# Patient Record
Sex: MALE | Race: ASIAN | NOT HISPANIC OR LATINO | URBAN - METROPOLITAN AREA
[De-identification: names, ages, dates, MRNs, and addresses within clinical notes are randomized per-mention and may not be internally consistent; named-entity substitution may affect disease eponyms.]

---

## 2020-01-02 ENCOUNTER — EMERGENCY (EMERGENCY)
Facility: HOSPITAL | Age: 40
LOS: 1 days | Discharge: HOME | End: 2020-01-02
Attending: EMERGENCY MEDICINE | Admitting: INTERNAL MEDICINE
Payer: SELF-PAY

## 2020-01-02 VITALS
DIASTOLIC BLOOD PRESSURE: 91 MMHG | HEART RATE: 126 BPM | SYSTOLIC BLOOD PRESSURE: 145 MMHG | OXYGEN SATURATION: 100 % | RESPIRATION RATE: 18 BRPM | TEMPERATURE: 98 F

## 2020-01-02 LAB
ALBUMIN SERPL ELPH-MCNC: 4.5 G/DL — SIGNIFICANT CHANGE UP (ref 3.3–5)
ALP SERPL-CCNC: 91 U/L — SIGNIFICANT CHANGE UP (ref 40–120)
ALT FLD-CCNC: 39 U/L — SIGNIFICANT CHANGE UP (ref 4–41)
ANION GAP SERPL CALC-SCNC: 14 MMO/L — SIGNIFICANT CHANGE UP (ref 7–14)
AST SERPL-CCNC: 34 U/L — SIGNIFICANT CHANGE UP (ref 4–40)
BASOPHILS # BLD AUTO: 0.06 K/UL — SIGNIFICANT CHANGE UP (ref 0–0.2)
BASOPHILS NFR BLD AUTO: 0.6 % — SIGNIFICANT CHANGE UP (ref 0–2)
BILIRUB SERPL-MCNC: 0.3 MG/DL — SIGNIFICANT CHANGE UP (ref 0.2–1.2)
BUN SERPL-MCNC: 10 MG/DL — SIGNIFICANT CHANGE UP (ref 7–23)
CALCIUM SERPL-MCNC: 9.8 MG/DL — SIGNIFICANT CHANGE UP (ref 8.4–10.5)
CHLORIDE SERPL-SCNC: 99 MMOL/L — SIGNIFICANT CHANGE UP (ref 98–107)
CO2 SERPL-SCNC: 23 MMOL/L — SIGNIFICANT CHANGE UP (ref 22–31)
CREAT SERPL-MCNC: 0.8 MG/DL — SIGNIFICANT CHANGE UP (ref 0.5–1.3)
EOSINOPHIL # BLD AUTO: 0.47 K/UL — SIGNIFICANT CHANGE UP (ref 0–0.5)
EOSINOPHIL NFR BLD AUTO: 4.9 % — SIGNIFICANT CHANGE UP (ref 0–6)
GLUCOSE SERPL-MCNC: 162 MG/DL — HIGH (ref 70–99)
HCT VFR BLD CALC: 48.4 % — SIGNIFICANT CHANGE UP (ref 39–50)
HGB BLD-MCNC: 16.3 G/DL — SIGNIFICANT CHANGE UP (ref 13–17)
IMM GRANULOCYTES NFR BLD AUTO: 0.6 % — SIGNIFICANT CHANGE UP (ref 0–1.5)
INR BLD: 0.97 — SIGNIFICANT CHANGE UP (ref 0.88–1.17)
LYMPHOCYTES # BLD AUTO: 2.82 K/UL — SIGNIFICANT CHANGE UP (ref 1–3.3)
LYMPHOCYTES # BLD AUTO: 29.7 % — SIGNIFICANT CHANGE UP (ref 13–44)
MCHC RBC-ENTMCNC: 29.3 PG — SIGNIFICANT CHANGE UP (ref 27–34)
MCHC RBC-ENTMCNC: 33.7 % — SIGNIFICANT CHANGE UP (ref 32–36)
MCV RBC AUTO: 86.9 FL — SIGNIFICANT CHANGE UP (ref 80–100)
MONOCYTES # BLD AUTO: 0.75 K/UL — SIGNIFICANT CHANGE UP (ref 0–0.9)
MONOCYTES NFR BLD AUTO: 7.9 % — SIGNIFICANT CHANGE UP (ref 2–14)
NEUTROPHILS # BLD AUTO: 5.35 K/UL — SIGNIFICANT CHANGE UP (ref 1.8–7.4)
NEUTROPHILS NFR BLD AUTO: 56.3 % — SIGNIFICANT CHANGE UP (ref 43–77)
NRBC # FLD: 0 K/UL — SIGNIFICANT CHANGE UP (ref 0–0)
PLATELET # BLD AUTO: 223 K/UL — SIGNIFICANT CHANGE UP (ref 150–400)
PMV BLD: 9.7 FL — SIGNIFICANT CHANGE UP (ref 7–13)
POTASSIUM SERPL-MCNC: 3.8 MMOL/L — SIGNIFICANT CHANGE UP (ref 3.5–5.3)
POTASSIUM SERPL-SCNC: 3.8 MMOL/L — SIGNIFICANT CHANGE UP (ref 3.5–5.3)
PROT SERPL-MCNC: 7.4 G/DL — SIGNIFICANT CHANGE UP (ref 6–8.3)
PROTHROM AB SERPL-ACNC: 11.1 SEC — SIGNIFICANT CHANGE UP (ref 9.8–13.1)
RBC # BLD: 5.57 M/UL — SIGNIFICANT CHANGE UP (ref 4.2–5.8)
RBC # FLD: 12.7 % — SIGNIFICANT CHANGE UP (ref 10.3–14.5)
SODIUM SERPL-SCNC: 136 MMOL/L — SIGNIFICANT CHANGE UP (ref 135–145)
WBC # BLD: 9.51 K/UL — SIGNIFICANT CHANGE UP (ref 3.8–10.5)
WBC # FLD AUTO: 9.51 K/UL — SIGNIFICANT CHANGE UP (ref 3.8–10.5)

## 2020-01-02 PROCEDURE — 99285 EMERGENCY DEPT VISIT HI MDM: CPT

## 2020-01-02 PROCEDURE — 71046 X-RAY EXAM CHEST 2 VIEWS: CPT | Mod: 26

## 2020-01-02 NOTE — ED ADULT NURSE NOTE - OBJECTIVE STATEMENT
Received Pt in intake room 13. Pt A&OX3, ambulatory. Pt brought in from home for elevated blood pressure, states he takes his blood pressure at home, non-compliant with medication. Pt also reports palpitations and left sided non radiating chest pain beginning tonight. Pt appears stable and in no apparent distress. Respirations are equal and nonlabored, no respiratory distress noted. Sinus rhythm on monitor. Denies any other complaints at this time. Denies sob, n & v, diarrhea, fever, cough, dizziness, headache. 20 gauge IV placed in the left ac, labs sent. Pt stable, family at bedside. awaiting further plan

## 2020-01-02 NOTE — ED ADULT TRIAGE NOTE - CHIEF COMPLAINT QUOTE
Pt brought in from home for elevated blood pressure, non-compliant with medication, prescribed Amlodipine, took dose with EMS, no sob, breathing even and unlabored. Pt also has h/o anxiety, states he is feels his palpitations.

## 2020-01-03 VITALS
RESPIRATION RATE: 18 BRPM | DIASTOLIC BLOOD PRESSURE: 98 MMHG | SYSTOLIC BLOOD PRESSURE: 128 MMHG | HEART RATE: 104 BPM | OXYGEN SATURATION: 99 %

## 2020-01-03 DIAGNOSIS — R07.9 CHEST PAIN, UNSPECIFIED: ICD-10-CM

## 2020-01-03 LAB
TROPONIN T, HIGH SENSITIVITY: < 6 NG/L — SIGNIFICANT CHANGE UP (ref ?–14)
TSH SERPL-MCNC: 1.83 UIU/ML — SIGNIFICANT CHANGE UP (ref 0.27–4.2)

## 2020-01-03 RX ORDER — ASPIRIN/CALCIUM CARB/MAGNESIUM 324 MG
81 TABLET ORAL DAILY
Refills: 0 | Status: DISCONTINUED | OUTPATIENT
Start: 2020-01-03 | End: 2020-01-03

## 2020-01-03 RX ORDER — INFLUENZA VIRUS VACCINE 15; 15; 15; 15 UG/.5ML; UG/.5ML; UG/.5ML; UG/.5ML
0.5 SUSPENSION INTRAMUSCULAR ONCE
Refills: 0 | Status: DISCONTINUED | OUTPATIENT
Start: 2020-01-03 | End: 2020-01-03

## 2020-01-03 RX ORDER — ACETAMINOPHEN 500 MG
650 TABLET ORAL EVERY 6 HOURS
Refills: 0 | Status: DISCONTINUED | OUTPATIENT
Start: 2020-01-03 | End: 2020-01-03

## 2020-01-03 RX ORDER — AMLODIPINE BESYLATE 2.5 MG/1
10 TABLET ORAL DAILY
Refills: 0 | Status: DISCONTINUED | OUTPATIENT
Start: 2020-01-03 | End: 2020-01-03

## 2020-01-03 RX ADMIN — Medication 81 MILLIGRAM(S): at 11:46

## 2020-01-03 RX ADMIN — AMLODIPINE BESYLATE 10 MILLIGRAM(S): 2.5 TABLET ORAL at 11:46

## 2020-01-03 NOTE — H&P ADULT - HISTORY OF PRESENT ILLNESS
This is a 39 year old man with HTN and Tachycardia presented to [facility] ER on 1/2/2020 with left sided exertional chest pain with diaphoresis that occurred yesterday while doing work in a basement. Pt states he checked his blood pressure at home and noticed it was 180/120, prompting him to come to the ED. Pt reports palpitations.  Pt denies fever, chills, cough, shortness of breath, calf pain, leg swelling, weakness, dizziness, or any other acute complaints.

## 2020-01-03 NOTE — CONSULT NOTE ADULT - SUBJECTIVE AND OBJECTIVE BOX
Cardiovascular Disease Initial Evaluation    CHIEF COMPLAINT: Chest Pain    HISTORY OF PRESENT ILLNESS:  This is a 39 year old man with HTN and Tachycardia presented to [facility] ER on 1/2/2020 with left sided exertional chest pain with diaphoresis that occurred yesterday while doing work in a basement. Pt states he checked his blood pressure at home and noticed it was 180/120, prompting him to come to the ED. Pt reports palpitations.  Pt denies fever, chills, cough, shortness of breath, calf pain, leg swelling, weakness, dizziness, or any other acute complaints.       Allergies  No Known Allergies        MEDICATIONS:    Reviewed    influenza   Vaccine 0.5 milliLiter(s) IntraMuscular once      PAST MEDICAL & SURGICAL HISTORY:  Tachycardia  HTN (hypertension)  No significant past surgical history      FAMILY HISTORY:  HTN      SOCIAL HISTORY:    The patient is a nonsmoker       REVIEW OF SYSTEMS:  See HPI, otherwise complete 14 point review of systems negative    PHYSICAL EXAM:  T(C): 36.8 (01-03-20 @ 04:07), Max: 37 (01-03-20 @ 00:50)  HR: 75 (01-03-20 @ 04:07) (75 - 126)  BP: 134/94 (01-03-20 @ 04:07) (134/94 - 156/94)  RR: 16 (01-03-20 @ 04:07) (16 - 18)  SpO2: 98% (01-03-20 @ 04:07) (98% - 100%)  Wt(kg): --  I&O's Summary      Appearance: No Acute Distress; resting comfortably  HEENT:  Normal oral mucosa, PERRL, EOMI	  Cardiovascular: Normal S1 S2, No JVD, No murmurs/rubs/gallops  Respiratory: Normal respiratory effort; Lungs clear to auscultation bilaterally  Gastrointestinal:  Soft, Non-tender, + BS	  Skin: No rashes, No ecchymoses, No cyanosis	  Neurologic: Non-focal; no weakness  Extremities: No clubbing, cyanosis or edema  Vascular: Peripheral pulses palpable 2+ bilaterally  Psychiatry: A & O x 3, Mood & affect appropriate    Laboratory Data:	 	    CBC Full  -  ( 02 Jan 2020 22:44 )  WBC Count : 9.51 K/uL  Hemoglobin : 16.3 g/dL  Hematocrit : 48.4 %  Platelet Count - Automated : 223 K/uL  Mean Cell Volume : 86.9 fL  Mean Cell Hemoglobin : 29.3 pg  Mean Cell Hemoglobin Concentration : 33.7 %  Auto Neutrophil # : 5.35 K/uL  Auto Lymphocyte # : 2.82 K/uL  Auto Monocyte # : 0.75 K/uL  Auto Eosinophil # : 0.47 K/uL  Auto Basophil # : 0.06 K/uL  Auto Neutrophil % : 56.3 %  Auto Lymphocyte % : 29.7 %  Auto Monocyte % : 7.9 %  Auto Eosinophil % : 4.9 %  Auto Basophil % : 0.6 %    01-02    136  |  99  |  10  ----------------------------<  162<H>  3.8   |  23  |  0.80    Ca    9.8      02 Jan 2020 22:44    TPro  7.4  /  Alb  4.5  /  TBili  0.3  /  DBili  x   /  AST  34  /  ALT  39  /  AlkPhos  91  01-02      proBNP:   Lipid Profile:   HgA1c:   TSH: Thyroid Stimulating Hormone, Serum: 1.83 uIU/mL (01-02 @ 22:44)      Interpretation of Telemetry: Sinus; no ectopy	    ECG:  	Sinus tachycardia; no ischemic changes.     Assessment: 39 year old man with HTN presents with chest pain.    Plan of Care:    #Chest pain-  Patient has ruled out for ACS.  EKG is sinus tachycardia with no ischemic changes.  Will further risk stratify with exercise treadmill stress testing.       72 minutes spent on total encounter; more than 50% of the visit was spent counseling and/or coordinating care by the attending physician.   	  Marcial Soto MD [facility]  Cardiovascular Diseases  [phone]

## 2020-01-03 NOTE — CHART NOTE - NSCHARTNOTEFT_GEN_A_CORE
Nigel HECK Surge Note    38 y/o male, with a PmHx of HTN and Tachycardia, presented to the [facility] ED c/o chest pain. Pt states his chest pain was a Left sided, non-radiating, 5/10, aching type of pain that started while at work yesterday morning 1/2/20 @ 10am. He denies any other symptoms. He was admitted to telemetry for r/o acs. Pt was seen and evaluated but due to surge, history and physical will be done by Dr. Soto (he is aware). Pt is currently asymptomatic. Will follow.    Abundio HECK

## 2020-01-03 NOTE — DISCHARGE NOTE PROVIDER - NSDCMRMEDTOKEN_GEN_ALL_CORE_FT
amLODIPine 10 mg oral tablet: 1 tab(s) orally once a day  Aspirin Enteric Coated 81 mg oral delayed release tablet: 1 tab(s) orally once a day

## 2020-01-03 NOTE — ED PROVIDER NOTE - OBJECTIVE STATEMENT
38 y/o M w/ PMH HTN and tachycardia presents to the ED c/o 1 day of L sided exertional chest pain with diaphoresis that occurred today while doing work in a basement. Pt states he checked his blood pressure at home and noticed it was 180/120, prompting him to come to the ED. Pt reports palpitations. Denies any fever, chills, cough, shortness of breath, calf pain, leg swelling, weakness, dizziness, or any other acute complaints. Pt took 2 baby aspirins PTA. 38 y/o Male with a PMHx of HTN and Tachycardia presents to the ER c/o 1 day of Left sided exertional chest pain with diaphoresis that occurred today while doing work in a basement. Pt states he checked his blood pressure at home and noticed it was 180/120, prompting him to come to the ED. Pt reports palpitations.  Pt denies fever, chills, cough, shortness of breath, calf pain, leg swelling, weakness, dizziness, or any other acute complaints. Pt took 2 Aspirin PTA.

## 2020-01-03 NOTE — DISCHARGE NOTE PROVIDER - CARE PROVIDER_API CALL
Marcial Soto)  Internal Medicine  [address] [place] [address]  Phone: [phone]  Fax: [phone]  Follow Up Time:

## 2020-01-03 NOTE — DISCHARGE NOTE NURSING/CASE MANAGEMENT/SOCIAL WORK - PATIENT PORTAL LINK FT
You can access the FollowMyHealth Patient Portal offered by [facility] by registering at the following website: [email or web address]. By joining [employer]’s FollowMyHealth portal, you will also be able to view your health information using other applications (apps) compatible with our system.

## 2020-01-03 NOTE — DISCHARGE NOTE PROVIDER - NSDCCPCAREPLAN_GEN_ALL_CORE_FT
PRINCIPAL DISCHARGE DIAGNOSIS  Diagnosis: Chest pain  Assessment and Plan of Treatment: follow up with your PMD in one week - call for appointment

## 2020-01-03 NOTE — H&P ADULT - ASSESSMENT
39 year old man with HTN presents with chest pain.    Plan of Care:    #Chest pain-  Patient has ruled out for ACS.  EKG is sinus tachycardia with no ischemic changes.  Will further risk stratify with exercise treadmill stress testing.     Discharge planning pending stress results.  Discussed with patient in 7N hallway.

## 2020-01-03 NOTE — ED PROVIDER NOTE - CLINICAL SUMMARY MEDICAL DECISION MAKING FREE TEXT BOX
40 y/o M w/ PMH HTN and tachycardia presents to the ED c/o 1 day of L sided exertional chest pain w/ diaphoresis that occurred today while doing work in a basement. Pt is well appearing and in NAD. EKG sinus tachycardia. Will check labs, chest x-ray, cardiac monitoring and likely admit for ACS and further evaluation. 40 y/o Male with a PMHx of HTN and tachycardia presents to the ER c/o 1 day of L sided exertional chest pain w/ diaphoresis that occurred today while doing work in a basement. Pt is well appearing and in NAD. EKG sinus tachycardia. Will check labs, chest x-ray, cardiac monitoring and likely admit for ACS and further evaluation.

## 2020-01-03 NOTE — DISCHARGE NOTE PROVIDER - HOSPITAL COURSE
40 y/o Male, with a PmHx of HTN and Tachycardia, presented to [facility] ER on 1/2/2020 with left sided exertional chest pain with diaphoresis. Admitted to telemetry for r/o acs.            1. Chest pain - r/o acs    EKG: Sinus tachycardia @ 115, T inv III    hsTrop: < 6               Glucose: 162    1/3 CXR - clear lungs        2. HTN    - monitor bp, cont meds, adjust as needed        ST: STRESS TEST IMPRESSIONS:    Exercise capacity: 10 METS, Fair for age and gender. 81%    of MPHR.    Chest Pain: No chest pain with exercise.    Symptom: Shortness of breath.    HR Response: Appropriate.    BP Response: Appropriate.    Heart Rhythm: Normal Sinus Rhythm - 95 BPM.    Baseline ECG: T wave abnormality in III, aVF, V3, V4, V5,    V6.    ECG Abnormalities: None.    Arrhythmia: None. 40 y/o Male, with a PmHx of HTN and Tachycardia, presented to [facility] ER on 1/2/2020 with left sided exertional chest pain with diaphoresis. Admitted to telemetry for r/o acs.            1. Chest pain - r/o acs    EKG: Sinus tachycardia @ 115, T inv III    hsTrop: < 6               Glucose: 162    1/3 CXR - clear lungs        2. HTN    - monitor bp, cont meds, adjust as needed        ST: STRESS TEST IMPRESSIONS:    Exercise capacity: 10 METS, Fair for age and gender. 81%    of MPHR.    Chest Pain: No chest pain with exercise.    Symptom: Shortness of breath.    HR Response: Appropriate.    BP Response: Appropriate.    Heart Rhythm: Normal Sinus Rhythm - 95 BPM.    Baseline ECG: T wave abnormality in III, aVF, V3, V4, V5,    V6.    ECG Abnormalities: None.    Arrhythmia: None.        Negative stress test despite suboptimal exercise.    Stable for discharge.

## 2021-01-13 NOTE — ED PROVIDER NOTE - NS ED SCRIBE STATEMENT
Follow up with Eran Barragan in 6 weeks.   -- Flexible fiber optic laryngoscopy was performed at today's visit.  Start Omeprazole 20 mg. Take 1 tablet by mouth Twice daily, 30 minutes before a meal.   .LARYNGOPHARYNGEAL REFLUX (LPR)    Gastroesophageal reflux (GERD) is the term for frequent abnormal backflow of stomach acid and irritating digestive juices up into the esophagus (at the food passage between the throat and stomach). Many people experience GERD as a simple occasional heartburn, but some people suffer more severe indigestion, with erosive inflammation of the esophageal lining. Nighttime is often worse than during the day.    A type of silent reflux, called laryngopharyngeal reflux (LPR), causes different symptoms. This kind of reflux happens less frequently and more quickly, but goes much higher into the throat, where the lining is much more sensitive. It usually involves little bits of acid, just a few drops at a time. It can happen day or night, but more typically during the day. Some people experience a sore throat, hoarseness, chronic cough, and even asthma. A lump sensation in the throat, or feeling of postnasal drip, phlegm or sour taste in the throat is typical with LPR. There is often a frustrating feeling of needing to constantly clear the throat. Heartburn symptoms are often absent in LPR, because the reflux happens too quickly for the esophagus to feel it. (Thus the term \"silent\") LPR is difficult to diagnose. The only reliable test for LPR is a somewhat invasive and cumbersome 24-hour monitor, involving a long flexible tube placed in the nose and swallowed into the stomach. Even this test might not detect LPR in 20% of patients.    If you have reflux, you can sometimes control the problem by controlling lifestyle and diet. Use as many of the following suggestions as needed to get relief:  · AVOID fatty foods, chocolate, mints and alcohol, especially in the evening. All of these have been  shown to increase reflux in many people.  · AVOID cola, beer, and milk. They may increase acid production.  · AVOID eating within 3 hours of bedtime, and do not lie down just after eating. Nighttime refluxers often benefit by sleeping with the head elevated on several extra pillows. This uses gravity to help make reflux less likely at night.  · IF YOU SMOKE, QUIT. Tobacco irritates the throat and can also cause more reflux.  · IF YOU ARE OVERWEIGHT, LOSE WEIGHT.  Clothing that fits tightly across the midsection of the body should be avoided.    If possible, AVOID CERTAIN MEDICATIONS, which can increase reflux:  1. Birth control pills  2. Heart and blood pressure medications containing nitrates or calcium channel blockers  3. Asthma medication containing theophylline (rarely used nowadays)  4. DO NOT stop prescription medication without the consent of your primary physician    Taking over-the-counter medication which blocks the stomach acid production can be helpful. The best one available is Prilosec OTC.  Laryngopharyngeal reflux is treated with twice a day dosing, unlike GERD which is typically only treated once a day. The two doses are best taken on an empty stomach, one hour before the morning and evening meals. Prescription medications similar to Prilosec are also available. Keep in mind that it may take 2 or 3 months using medication daily to get good relief of throat symptoms. Other less potent but somewhat helpful over-the-counter medications include Zantac (two 75 mg tablets twice daily, or one 150 mg tablet twice daily), and Pepcid AC (two tablets twice daily). All these medications are generally safe, but if you have questions or concerns regarding interaction with other medications you now take, ask your primary physician and pharmacist. If medication is helpful controlling your reflux, consider using it daily for up to 6 months, then taper off the medication and start taking it again in the future if  reflux symptoms recur.    SURGERY, called the Nissen fundoplication, can be a very effective treatment to control reflux. It is a last resort for the most severe cases..     ACP

## 2021-12-10 ENCOUNTER — EMERGENCY (EMERGENCY)
Facility: HOSPITAL | Age: 41
LOS: 0 days | Discharge: HOME | End: 2021-12-10
Attending: EMERGENCY MEDICINE
Payer: MEDICAID

## 2021-12-10 VITALS
RESPIRATION RATE: 20 BRPM | HEART RATE: 126 BPM | OXYGEN SATURATION: 98 % | SYSTOLIC BLOOD PRESSURE: 138 MMHG | HEIGHT: 67 IN | TEMPERATURE: 98 F | DIASTOLIC BLOOD PRESSURE: 102 MMHG

## 2021-12-10 VITALS — HEART RATE: 98 BPM

## 2021-12-10 DIAGNOSIS — R10.9 UNSPECIFIED ABDOMINAL PAIN: ICD-10-CM

## 2021-12-10 DIAGNOSIS — I10 ESSENTIAL (PRIMARY) HYPERTENSION: ICD-10-CM

## 2021-12-10 DIAGNOSIS — R11.2 NAUSEA WITH VOMITING, UNSPECIFIED: ICD-10-CM

## 2021-12-10 DIAGNOSIS — R00.0 TACHYCARDIA, UNSPECIFIED: ICD-10-CM

## 2021-12-10 DIAGNOSIS — R10.12 LEFT UPPER QUADRANT PAIN: ICD-10-CM

## 2021-12-10 DIAGNOSIS — Z86.79 PERSONAL HISTORY OF OTHER DISEASES OF THE CIRCULATORY SYSTEM: ICD-10-CM

## 2021-12-10 DIAGNOSIS — Z79.82 LONG TERM (CURRENT) USE OF ASPIRIN: ICD-10-CM

## 2021-12-10 LAB
ALBUMIN SERPL ELPH-MCNC: 3.6 G/DL — SIGNIFICANT CHANGE UP (ref 3.3–5)
ALP SERPL-CCNC: 108 U/L — SIGNIFICANT CHANGE UP (ref 40–120)
ALT FLD-CCNC: 66 U/L — SIGNIFICANT CHANGE UP (ref 12–78)
ANION GAP SERPL CALC-SCNC: 11 MMOL/L — SIGNIFICANT CHANGE UP (ref 5–17)
APPEARANCE UR: CLEAR — SIGNIFICANT CHANGE UP
AST SERPL-CCNC: 49 U/L — HIGH (ref 15–37)
BASOPHILS # BLD AUTO: 0.1 K/UL — SIGNIFICANT CHANGE UP (ref 0–0.2)
BASOPHILS NFR BLD AUTO: 1.1 % — SIGNIFICANT CHANGE UP (ref 0–2)
BILIRUB SERPL-MCNC: 0.5 MG/DL — SIGNIFICANT CHANGE UP (ref 0.2–1.2)
BILIRUB UR-MCNC: NEGATIVE — SIGNIFICANT CHANGE UP
BUN SERPL-MCNC: 14 MG/DL — SIGNIFICANT CHANGE UP (ref 7–23)
CALCIUM SERPL-MCNC: 9.3 MG/DL — SIGNIFICANT CHANGE UP (ref 8.5–10.1)
CHLORIDE SERPL-SCNC: 102 MMOL/L — SIGNIFICANT CHANGE UP (ref 96–108)
CO2 SERPL-SCNC: 26 MMOL/L — SIGNIFICANT CHANGE UP (ref 22–31)
COLOR SPEC: YELLOW — SIGNIFICANT CHANGE UP
CREAT SERPL-MCNC: 1.17 MG/DL — SIGNIFICANT CHANGE UP (ref 0.5–1.3)
DIFF PNL FLD: NEGATIVE — SIGNIFICANT CHANGE UP
EOSINOPHIL # BLD AUTO: 0.01 K/UL — SIGNIFICANT CHANGE UP (ref 0–0.5)
EOSINOPHIL NFR BLD AUTO: 0.1 % — SIGNIFICANT CHANGE UP (ref 0–6)
GLUCOSE SERPL-MCNC: 155 MG/DL — HIGH (ref 70–99)
GLUCOSE UR QL: 50 MG/DL
HCT VFR BLD CALC: 44.7 % — SIGNIFICANT CHANGE UP (ref 39–50)
HGB BLD-MCNC: 16.1 G/DL — SIGNIFICANT CHANGE UP (ref 13–17)
IMM GRANULOCYTES NFR BLD AUTO: 0.6 % — SIGNIFICANT CHANGE UP (ref 0–1.5)
KETONES UR-MCNC: NEGATIVE — SIGNIFICANT CHANGE UP
LEUKOCYTE ESTERASE UR-ACNC: NEGATIVE — SIGNIFICANT CHANGE UP
LIDOCAIN IGE QN: 251 U/L — SIGNIFICANT CHANGE UP (ref 73–393)
LYMPHOCYTES # BLD AUTO: 1.89 K/UL — SIGNIFICANT CHANGE UP (ref 1–3.3)
LYMPHOCYTES # BLD AUTO: 21.7 % — SIGNIFICANT CHANGE UP (ref 13–44)
MCHC RBC-ENTMCNC: 30 PG — SIGNIFICANT CHANGE UP (ref 27–34)
MCHC RBC-ENTMCNC: 36 GM/DL — SIGNIFICANT CHANGE UP (ref 32–36)
MCV RBC AUTO: 83.4 FL — SIGNIFICANT CHANGE UP (ref 80–100)
MONOCYTES # BLD AUTO: 0.89 K/UL — SIGNIFICANT CHANGE UP (ref 0–0.9)
MONOCYTES NFR BLD AUTO: 10.2 % — SIGNIFICANT CHANGE UP (ref 2–14)
NEUTROPHILS # BLD AUTO: 5.78 K/UL — SIGNIFICANT CHANGE UP (ref 1.8–7.4)
NEUTROPHILS NFR BLD AUTO: 66.3 % — SIGNIFICANT CHANGE UP (ref 43–77)
NITRITE UR-MCNC: NEGATIVE — SIGNIFICANT CHANGE UP
NRBC # BLD: 0 /100 WBCS — SIGNIFICANT CHANGE UP (ref 0–0)
PH UR: 8 — SIGNIFICANT CHANGE UP (ref 5–8)
PLATELET # BLD AUTO: 375 K/UL — SIGNIFICANT CHANGE UP (ref 150–400)
POTASSIUM SERPL-MCNC: 3.7 MMOL/L — SIGNIFICANT CHANGE UP (ref 3.5–5.3)
POTASSIUM SERPL-SCNC: 3.7 MMOL/L — SIGNIFICANT CHANGE UP (ref 3.5–5.3)
PROT SERPL-MCNC: 8 GM/DL — SIGNIFICANT CHANGE UP (ref 6–8.3)
PROT UR-MCNC: NEGATIVE MG/DL — SIGNIFICANT CHANGE UP
RBC # BLD: 5.36 M/UL — SIGNIFICANT CHANGE UP (ref 4.2–5.8)
RBC # FLD: 12.3 % — SIGNIFICANT CHANGE UP (ref 10.3–14.5)
SODIUM SERPL-SCNC: 139 MMOL/L — SIGNIFICANT CHANGE UP (ref 135–145)
SP GR SPEC: 1.01 — SIGNIFICANT CHANGE UP (ref 1.01–1.02)
UROBILINOGEN FLD QL: NEGATIVE MG/DL — SIGNIFICANT CHANGE UP
WBC # BLD: 8.72 K/UL — SIGNIFICANT CHANGE UP (ref 3.8–10.5)
WBC # FLD AUTO: 8.72 K/UL — SIGNIFICANT CHANGE UP (ref 3.8–10.5)

## 2021-12-10 PROCEDURE — 99285 EMERGENCY DEPT VISIT HI MDM: CPT

## 2021-12-10 PROCEDURE — 93010 ELECTROCARDIOGRAM REPORT: CPT

## 2021-12-10 PROCEDURE — 74177 CT ABD & PELVIS W/CONTRAST: CPT | Mod: 26,MA

## 2021-12-10 RX ORDER — MORPHINE SULFATE 50 MG/1
2 CAPSULE, EXTENDED RELEASE ORAL ONCE
Refills: 0 | Status: DISCONTINUED | OUTPATIENT
Start: 2021-12-10 | End: 2021-12-10

## 2021-12-10 RX ORDER — FAMOTIDINE 10 MG/ML
1 INJECTION INTRAVENOUS
Qty: 30 | Refills: 0
Start: 2021-12-10 | End: 2022-01-08

## 2021-12-10 RX ORDER — ONDANSETRON 8 MG/1
4 TABLET, FILM COATED ORAL ONCE
Refills: 0 | Status: COMPLETED | OUTPATIENT
Start: 2021-12-10 | End: 2021-12-10

## 2021-12-10 RX ORDER — SODIUM CHLORIDE 9 MG/ML
1000 INJECTION INTRAMUSCULAR; INTRAVENOUS; SUBCUTANEOUS ONCE
Refills: 0 | Status: COMPLETED | OUTPATIENT
Start: 2021-12-10 | End: 2021-12-10

## 2021-12-10 RX ORDER — FAMOTIDINE 10 MG/ML
20 INJECTION INTRAVENOUS ONCE
Refills: 0 | Status: COMPLETED | OUTPATIENT
Start: 2021-12-10 | End: 2021-12-10

## 2021-12-10 RX ORDER — ONDANSETRON 8 MG/1
1 TABLET, FILM COATED ORAL
Qty: 9 | Refills: 0
Start: 2021-12-10 | End: 2021-12-12

## 2021-12-10 RX ADMIN — FAMOTIDINE 20 MILLIGRAM(S): 10 INJECTION INTRAVENOUS at 16:23

## 2021-12-10 RX ADMIN — SODIUM CHLORIDE 1000 MILLILITER(S): 9 INJECTION INTRAMUSCULAR; INTRAVENOUS; SUBCUTANEOUS at 16:24

## 2021-12-10 RX ADMIN — Medication 50 MILLIGRAM(S): at 16:48

## 2021-12-10 RX ADMIN — MORPHINE SULFATE 2 MILLIGRAM(S): 50 CAPSULE, EXTENDED RELEASE ORAL at 14:00

## 2021-12-10 RX ADMIN — SODIUM CHLORIDE 1000 MILLILITER(S): 9 INJECTION INTRAMUSCULAR; INTRAVENOUS; SUBCUTANEOUS at 14:01

## 2021-12-10 RX ADMIN — MORPHINE SULFATE 2 MILLIGRAM(S): 50 CAPSULE, EXTENDED RELEASE ORAL at 16:24

## 2021-12-10 RX ADMIN — ONDANSETRON 4 MILLIGRAM(S): 8 TABLET, FILM COATED ORAL at 14:00

## 2021-12-10 NOTE — ED ADULT NURSE NOTE - OBJECTIVE STATEMENT
PT is A&Ox4, c/o n/v/d left lower abdominal pain x 3 days, cough.  hx etoh abuse drinks "3 shots" whiskey daily last drink yesterday no tremors noted at present. Pt hasn't eaten for the past 2 days, throwing up all liquids. Denies chest pain, sob. PT is A&Ox4, c/o n/v/d left lower abdominal pain x 3 days, cough. rated 8/10, intermittent sharp shooting in quality.  hx etoh abuse drinks "3 shots" whiskey daily last drink yesterday no tremors noted at present. Pt hasn't eaten for the past 2 days, throwing up all liquids. Denies chest pain, sob.

## 2021-12-10 NOTE — ED PROVIDER NOTE - CLINICAL SUMMARY MEDICAL DECISION MAKING FREE TEXT BOX
Nausea, vomiting and abdominal pain x 3 days in the day not happening with alcohol.  Patient with alcohol abuse minimal abdominal tenderness.  Will check labs, UA, CT, give medication for pain and consider librium.

## 2021-12-10 NOTE — ED PROVIDER NOTE - PATIENT PORTAL LINK FT
You can access the FollowMyHealth Patient Portal offered by [facility] by registering at the following website: [email or web address]. By joining [employer]’s FollowMyHealth portal, you will also be able to view your health information using other applications (apps) compatible with our system.

## 2021-12-10 NOTE — ED ADULT TRIAGE NOTE - CHIEF COMPLAINT QUOTE
c/o n/v/d l lower abdominal pain x 3 days hx etoh abuse drinks "3 shots" whiskey daily last drink yesterday no tremors noted at present

## 2021-12-10 NOTE — ED ADULT NURSE NOTE - NSIMPLEMENTINTERV_GEN_ALL_ED
Implemented All Universal Safety Interventions:  [place] to call system. Call bell, personal items and telephone within reach. Instruct patient to call for assistance. Room bathroom lighting operational. Non-slip footwear when patient is off stretcher. Physically safe environment: no spills, clutter or unnecessary equipment. Stretcher in lowest position, wheels locked, appropriate side rails in place.

## 2021-12-10 NOTE — ED PROVIDER NOTE - NSFOLLOWUPINSTRUCTIONS_ED_ALL_ED_FT
1) Take prescription medication as instructed  2) DO NOT ABUSE ALCOHOL  3) Follow-up with gastroenterologist (GI)  4) Follow up with your primary care doctor  5) Return to the ER for worsening or concerning symptoms

## 2021-12-10 NOTE — SBIRT NOTE ADULT - NSSBIRTBRIEFINTDET_GEN_A_CORE
Provided SBIRT services: Full screen positive. Pt reports 3 shots of either whiskey or vodka daily, at night. Brief Intervention Performed. Screening results were reviewed with the patient and patient was provided information about healthy guidelines and potential negative consequences associated with level of risk. Motivation and readiness to reduce or stop use was discussed and goals and activities to make changes were suggested/offered. Pt reports that his cardiologist told him to cut down/out alcohol, as he is on medication for HBP. Stated alcohol helps him sleep, so he has been trying yoga at night to replace it. We discussed other options as well, such as eating while drinking and pacing with a club soda between shots. Patient reports he will continue to cut down on his own. Declined peer support and other resources. Pt accepted [employer]'s "Rethinking Drinking" pamphlet.

## 2021-12-10 NOTE — ED PROVIDER NOTE - OBJECTIVE STATEMENT
This patient is a 41 year old man who presents to the ER c/o abdominal pain, nausea, vomiting x 3 days.  He states that the symptoms have been occurring during the day but he is able to drink his alcohol at night without vomiting.  He describes the pain as intermittent in the LUQ 8/10 in severity.  Patient states that he drinks 3 shots every night only at night.  He denies hx of alcohol withdrawal.  He has no reports fever, headache, vomiting, or tremor.

## 2022-05-22 ENCOUNTER — EMERGENCY (EMERGENCY)
Facility: HOSPITAL | Age: 42
LOS: 0 days | Discharge: HOME | End: 2022-05-22
Attending: STUDENT IN AN ORGANIZED HEALTH CARE EDUCATION/TRAINING PROGRAM
Payer: MEDICAID

## 2022-05-22 VITALS
WEIGHT: 173.06 LBS | SYSTOLIC BLOOD PRESSURE: 126 MMHG | TEMPERATURE: 98 F | DIASTOLIC BLOOD PRESSURE: 86 MMHG | RESPIRATION RATE: 19 BRPM | OXYGEN SATURATION: 97 % | HEART RATE: 104 BPM | HEIGHT: 67 IN

## 2022-05-22 VITALS
HEART RATE: 89 BPM | TEMPERATURE: 99 F | DIASTOLIC BLOOD PRESSURE: 76 MMHG | SYSTOLIC BLOOD PRESSURE: 128 MMHG | OXYGEN SATURATION: 97 % | RESPIRATION RATE: 16 BRPM

## 2022-05-22 DIAGNOSIS — I10 ESSENTIAL (PRIMARY) HYPERTENSION: ICD-10-CM

## 2022-05-22 DIAGNOSIS — R10.9 UNSPECIFIED ABDOMINAL PAIN: ICD-10-CM

## 2022-05-22 DIAGNOSIS — R07.89 OTHER CHEST PAIN: ICD-10-CM

## 2022-05-22 DIAGNOSIS — Z20.822 CONTACT WITH AND (SUSPECTED) EXPOSURE TO COVID-19: ICD-10-CM

## 2022-05-22 DIAGNOSIS — R10.13 EPIGASTRIC PAIN: ICD-10-CM

## 2022-05-22 DIAGNOSIS — F10.129 ALCOHOL ABUSE WITH INTOXICATION, UNSPECIFIED: ICD-10-CM

## 2022-05-22 DIAGNOSIS — Z79.82 LONG TERM (CURRENT) USE OF ASPIRIN: ICD-10-CM

## 2022-05-22 DIAGNOSIS — R06.02 SHORTNESS OF BREATH: ICD-10-CM

## 2022-05-22 LAB
ALBUMIN SERPL ELPH-MCNC: 3.6 G/DL — SIGNIFICANT CHANGE UP (ref 3.3–5)
ALP SERPL-CCNC: 103 U/L — SIGNIFICANT CHANGE UP (ref 40–120)
ALT FLD-CCNC: 98 U/L — HIGH (ref 12–78)
ANION GAP SERPL CALC-SCNC: 10 MMOL/L — SIGNIFICANT CHANGE UP (ref 5–17)
AST SERPL-CCNC: 113 U/L — HIGH (ref 15–37)
BASOPHILS # BLD AUTO: 0.07 K/UL — SIGNIFICANT CHANGE UP (ref 0–0.2)
BASOPHILS NFR BLD AUTO: 1.1 % — SIGNIFICANT CHANGE UP (ref 0–2)
BILIRUB SERPL-MCNC: 0.5 MG/DL — SIGNIFICANT CHANGE UP (ref 0.2–1.2)
BUN SERPL-MCNC: 4 MG/DL — LOW (ref 7–23)
CALCIUM SERPL-MCNC: 9.3 MG/DL — SIGNIFICANT CHANGE UP (ref 8.5–10.1)
CHLORIDE SERPL-SCNC: 103 MMOL/L — SIGNIFICANT CHANGE UP (ref 96–108)
CO2 SERPL-SCNC: 25 MMOL/L — SIGNIFICANT CHANGE UP (ref 22–31)
CREAT SERPL-MCNC: 1.08 MG/DL — SIGNIFICANT CHANGE UP (ref 0.5–1.3)
EGFR: 88 ML/MIN/1.73M2 — SIGNIFICANT CHANGE UP
EOSINOPHIL # BLD AUTO: 0.22 K/UL — SIGNIFICANT CHANGE UP (ref 0–0.5)
EOSINOPHIL NFR BLD AUTO: 3.5 % — SIGNIFICANT CHANGE UP (ref 0–6)
ETHANOL SERPL-MCNC: 103 MG/DL — HIGH (ref 0–10)
FLUAV AG NPH QL: SIGNIFICANT CHANGE UP
FLUBV AG NPH QL: SIGNIFICANT CHANGE UP
GLUCOSE SERPL-MCNC: 147 MG/DL — HIGH (ref 70–99)
HCT VFR BLD CALC: 47.7 % — SIGNIFICANT CHANGE UP (ref 39–50)
HGB BLD-MCNC: 16.6 G/DL — SIGNIFICANT CHANGE UP (ref 13–17)
IMM GRANULOCYTES NFR BLD AUTO: 1.3 % — SIGNIFICANT CHANGE UP (ref 0–1.5)
LIDOCAIN IGE QN: 690 U/L — HIGH (ref 73–393)
LYMPHOCYTES # BLD AUTO: 2.61 K/UL — SIGNIFICANT CHANGE UP (ref 1–3.3)
LYMPHOCYTES # BLD AUTO: 41.6 % — SIGNIFICANT CHANGE UP (ref 13–44)
MAGNESIUM SERPL-MCNC: 2.1 MG/DL — SIGNIFICANT CHANGE UP (ref 1.6–2.6)
MCHC RBC-ENTMCNC: 29.3 PG — SIGNIFICANT CHANGE UP (ref 27–34)
MCHC RBC-ENTMCNC: 34.8 G/DL — SIGNIFICANT CHANGE UP (ref 32–36)
MCV RBC AUTO: 84.1 FL — SIGNIFICANT CHANGE UP (ref 80–100)
MONOCYTES # BLD AUTO: 0.59 K/UL — SIGNIFICANT CHANGE UP (ref 0–0.9)
MONOCYTES NFR BLD AUTO: 9.4 % — SIGNIFICANT CHANGE UP (ref 2–14)
NEUTROPHILS # BLD AUTO: 2.7 K/UL — SIGNIFICANT CHANGE UP (ref 1.8–7.4)
NEUTROPHILS NFR BLD AUTO: 43.1 % — SIGNIFICANT CHANGE UP (ref 43–77)
NRBC # BLD: 0 /100 WBCS — SIGNIFICANT CHANGE UP (ref 0–0)
NT-PROBNP SERPL-SCNC: 10 PG/ML — SIGNIFICANT CHANGE UP (ref 0–125)
PHOSPHATE SERPL-MCNC: 3.4 MG/DL — SIGNIFICANT CHANGE UP (ref 2.5–4.5)
PLATELET # BLD AUTO: 312 K/UL — SIGNIFICANT CHANGE UP (ref 150–400)
POTASSIUM SERPL-MCNC: 3.5 MMOL/L — SIGNIFICANT CHANGE UP (ref 3.5–5.3)
POTASSIUM SERPL-SCNC: 3.5 MMOL/L — SIGNIFICANT CHANGE UP (ref 3.5–5.3)
PROT SERPL-MCNC: 8.1 GM/DL — SIGNIFICANT CHANGE UP (ref 6–8.3)
RBC # BLD: 5.67 M/UL — SIGNIFICANT CHANGE UP (ref 4.2–5.8)
RBC # FLD: 13.2 % — SIGNIFICANT CHANGE UP (ref 10.3–14.5)
SARS-COV-2 RNA SPEC QL NAA+PROBE: SIGNIFICANT CHANGE UP
SODIUM SERPL-SCNC: 138 MMOL/L — SIGNIFICANT CHANGE UP (ref 135–145)
TROPONIN I, HIGH SENSITIVITY RESULT: 5.5 NG/L — SIGNIFICANT CHANGE UP
WBC # BLD: 6.27 K/UL — SIGNIFICANT CHANGE UP (ref 3.8–10.5)
WBC # FLD AUTO: 6.27 K/UL — SIGNIFICANT CHANGE UP (ref 3.8–10.5)

## 2022-05-22 PROCEDURE — 93010 ELECTROCARDIOGRAM REPORT: CPT

## 2022-05-22 PROCEDURE — 71045 X-RAY EXAM CHEST 1 VIEW: CPT | Mod: 26

## 2022-05-22 PROCEDURE — 74177 CT ABD & PELVIS W/CONTRAST: CPT | Mod: 26,MC

## 2022-05-22 PROCEDURE — 99285 EMERGENCY DEPT VISIT HI MDM: CPT

## 2022-05-22 RX ORDER — ONDANSETRON 8 MG/1
4 TABLET, FILM COATED ORAL ONCE
Refills: 0 | Status: DISCONTINUED | OUTPATIENT
Start: 2022-05-22 | End: 2022-05-22

## 2022-05-22 RX ORDER — ONDANSETRON 8 MG/1
1 TABLET, FILM COATED ORAL
Qty: 9 | Refills: 0
Start: 2022-05-22 | End: 2023-04-25

## 2022-05-22 RX ORDER — THIAMINE MONONITRATE (VIT B1) 100 MG
100 TABLET ORAL ONCE
Refills: 0 | Status: COMPLETED | OUTPATIENT
Start: 2022-05-22 | End: 2022-05-22

## 2022-05-22 RX ORDER — ONDANSETRON 8 MG/1
1 TABLET, FILM COATED ORAL
Qty: 9 | Refills: 0
Start: 2022-05-22 | End: 2022-05-24

## 2022-05-22 RX ORDER — FOLIC ACID 0.8 MG
1 TABLET ORAL ONCE
Refills: 0 | Status: COMPLETED | OUTPATIENT
Start: 2022-05-22 | End: 2022-05-22

## 2022-05-22 RX ORDER — ONDANSETRON 8 MG/1
4 TABLET, FILM COATED ORAL ONCE
Refills: 0 | Status: COMPLETED | OUTPATIENT
Start: 2022-05-22 | End: 2022-05-22

## 2022-05-22 RX ORDER — FAMOTIDINE 10 MG/ML
20 INJECTION INTRAVENOUS ONCE
Refills: 0 | Status: COMPLETED | OUTPATIENT
Start: 2022-05-22 | End: 2022-05-22

## 2022-05-22 RX ORDER — FAMOTIDINE 10 MG/ML
1 INJECTION INTRAVENOUS
Qty: 14 | Refills: 0
Start: 2022-05-22 | End: 2022-06-04

## 2022-05-22 RX ADMIN — ONDANSETRON 4 MILLIGRAM(S): 8 TABLET, FILM COATED ORAL at 04:27

## 2022-05-22 RX ADMIN — FAMOTIDINE 20 MILLIGRAM(S): 10 INJECTION INTRAVENOUS at 04:39

## 2022-05-22 RX ADMIN — Medication 50 MILLIGRAM(S): at 07:34

## 2022-05-22 RX ADMIN — Medication 1 MILLIGRAM(S): at 07:34

## 2022-05-22 RX ADMIN — Medication 100 MILLIGRAM(S): at 07:34

## 2022-05-22 NOTE — ED PROVIDER NOTE - PROGRESS NOTE DETAILS
DEMETRIA 103, Lipase 690. Pt care signed out at change of shift, pending CT AP. Pt was seen and treated by Dr. Guido ct abd/pelvis normal Pt tolerated water orally Pt's abd/pelvis is non tender to palp x 4 q Pt and wife at bed side are given and explained all test reports and advised to follow up with pmd and return if unable to tolerate orally or worsening symptoms.

## 2022-05-22 NOTE — ED PROVIDER NOTE - PATIENT PORTAL LINK FT
You can access the FollowMyHealth Patient Portal offered by [facility] by registering at the following website: [email or web address]. By joining [employer]’s FollowMyHealth portal, you will also be able to view your health information using other applications (apps) compatible with our system.

## 2022-05-22 NOTE — ED ADULT TRIAGE NOTE - CHIEF COMPLAINT QUOTE
chest pain and left upper abdominal pain on and off x 2 days. patient states has been drinking a lot for the past week, complaining of vomiting  hx of htn

## 2022-05-22 NOTE — ED PROVIDER NOTE - OBJECTIVE STATEMENT
41M w/ PMH HTN, tachycardia pw 2 days of L sided CP and L sided abd pain. Pt describes chest and abd pain as burning, constant. Denies previous similar pain. Pt endorses mild h/a, mild SOB, mild cough, + nausea and vomiting stomach acid, + diarrhea. Denies fever, back pain, UTI sx, LE swelling. Pt admits to drinking daily x past 1wk, last drink 1-2 days ago. CIWA 5. Non smoker.     PMH as above, PSH none, NKDA, meds metoprolol, amlodipine.

## 2022-05-22 NOTE — ED PROVIDER NOTE - CARE PLAN
1 Principal Discharge DX:	Epigastric pain   Principal Discharge DX:	Epigastric pain  Secondary Diagnosis:	Alcohol intoxication

## 2022-05-22 NOTE — ED ADULT NURSE NOTE - OBJECTIVE STATEMENT
covering for primary RN; Patient presents to ED awake, alert and oriented x4 c/o left lower abd pain w/ left chest pain x 2 days ag a/w headache, nv. Endorses he drinks 5 shots everyday, last drink 2 days ago.

## 2022-05-22 NOTE — ED ADULT NURSE REASSESSMENT NOTE - NS ED NURSE REASSESS COMMENT FT1
Received patient in stretcher; alert and oriented x4. Complains of mild headache. Pain score is 2/10. Able to ambulate without assistance. Meds given as ordered.

## 2022-05-22 NOTE — ED PROVIDER NOTE - CLINICAL SUMMARY MEDICAL DECISION MAKING FREE TEXT BOX
41M w/ PMH HTN, tachycardia pw constant burning L sided CP / L sided abd pain x 2 days in setting of heavy EtOH x 1wk. AF, VSS. Well appearing, in NAD. CIWA 5. Plan: CBC, CMP, lipase, mag / phos, DEMETRIA, ECG, trop, CXR. Flu/Covid. Give Pepcid, Zofran, IVF. Re-eval. 41M w/ PMH HTN, tachycardia pw constant burning L sided CP / L sided abd pain x 2 days in setting of heavy EtOH x 1wk. AF, VSS. Well appearing, in NAD. CIWA 5. Plan: CBC, CMP, lipase, mag / phos, DEMETRIA, ECG, trop, CXR. Flu/Covid +/- CT AP. Give Pepcid, Zofran, IVF. Re-eval.

## 2022-05-23 NOTE — ED POST DISCHARGE NOTE - DETAILS
States he feels much better, denies fevers, chills, cp, sob or other s/s of pna.  Agreeable to f/u with pcp

## 2022-09-05 ENCOUNTER — EMERGENCY (EMERGENCY)
Facility: HOSPITAL | Age: 42
LOS: 0 days | Discharge: HOME | End: 2022-09-05
Attending: STUDENT IN AN ORGANIZED HEALTH CARE EDUCATION/TRAINING PROGRAM

## 2022-09-05 VITALS
HEIGHT: 67 IN | DIASTOLIC BLOOD PRESSURE: 106 MMHG | HEART RATE: 121 BPM | SYSTOLIC BLOOD PRESSURE: 144 MMHG | OXYGEN SATURATION: 99 % | TEMPERATURE: 98 F | RESPIRATION RATE: 18 BRPM | WEIGHT: 171.96 LBS

## 2022-09-05 VITALS
SYSTOLIC BLOOD PRESSURE: 121 MMHG | HEART RATE: 93 BPM | TEMPERATURE: 99 F | RESPIRATION RATE: 20 BRPM | OXYGEN SATURATION: 95 % | DIASTOLIC BLOOD PRESSURE: 84 MMHG

## 2022-09-05 DIAGNOSIS — R11.2 NAUSEA WITH VOMITING, UNSPECIFIED: ICD-10-CM

## 2022-09-05 DIAGNOSIS — R10.13 EPIGASTRIC PAIN: ICD-10-CM

## 2022-09-05 DIAGNOSIS — Z79.82 LONG TERM (CURRENT) USE OF ASPIRIN: ICD-10-CM

## 2022-09-05 DIAGNOSIS — R00.2 PALPITATIONS: ICD-10-CM

## 2022-09-05 DIAGNOSIS — I10 ESSENTIAL (PRIMARY) HYPERTENSION: ICD-10-CM

## 2022-09-05 DIAGNOSIS — R12 HEARTBURN: ICD-10-CM

## 2022-09-05 LAB
ALBUMIN SERPL ELPH-MCNC: 3.5 G/DL — SIGNIFICANT CHANGE UP (ref 3.3–5)
ALP SERPL-CCNC: 114 U/L — SIGNIFICANT CHANGE UP (ref 40–120)
ALT FLD-CCNC: 55 U/L — SIGNIFICANT CHANGE UP (ref 12–78)
ANION GAP SERPL CALC-SCNC: 10 MMOL/L — SIGNIFICANT CHANGE UP (ref 5–17)
AST SERPL-CCNC: 51 U/L — HIGH (ref 15–37)
BASOPHILS # BLD AUTO: 0.05 K/UL — SIGNIFICANT CHANGE UP (ref 0–0.2)
BASOPHILS NFR BLD AUTO: 0.7 % — SIGNIFICANT CHANGE UP (ref 0–2)
BILIRUB SERPL-MCNC: 0.5 MG/DL — SIGNIFICANT CHANGE UP (ref 0.2–1.2)
BUN SERPL-MCNC: 2 MG/DL — LOW (ref 7–23)
CALCIUM SERPL-MCNC: 9.6 MG/DL — SIGNIFICANT CHANGE UP (ref 8.5–10.1)
CHLORIDE SERPL-SCNC: 103 MMOL/L — SIGNIFICANT CHANGE UP (ref 96–108)
CO2 SERPL-SCNC: 24 MMOL/L — SIGNIFICANT CHANGE UP (ref 22–31)
CREAT SERPL-MCNC: 0.98 MG/DL — SIGNIFICANT CHANGE UP (ref 0.5–1.3)
EGFR: 98 ML/MIN/1.73M2 — SIGNIFICANT CHANGE UP
EOSINOPHIL # BLD AUTO: 0.11 K/UL — SIGNIFICANT CHANGE UP (ref 0–0.5)
EOSINOPHIL NFR BLD AUTO: 1.4 % — SIGNIFICANT CHANGE UP (ref 0–6)
GLUCOSE SERPL-MCNC: 130 MG/DL — HIGH (ref 70–99)
HCT VFR BLD CALC: 50 % — SIGNIFICANT CHANGE UP (ref 39–50)
HGB BLD-MCNC: 17.3 G/DL — HIGH (ref 13–17)
IMM GRANULOCYTES NFR BLD AUTO: 0.7 % — SIGNIFICANT CHANGE UP (ref 0–1.5)
LIDOCAIN IGE QN: 272 U/L — SIGNIFICANT CHANGE UP (ref 73–393)
LYMPHOCYTES # BLD AUTO: 1.94 K/UL — SIGNIFICANT CHANGE UP (ref 1–3.3)
LYMPHOCYTES # BLD AUTO: 25.5 % — SIGNIFICANT CHANGE UP (ref 13–44)
MAGNESIUM SERPL-MCNC: 2 MG/DL — SIGNIFICANT CHANGE UP (ref 1.6–2.6)
MCHC RBC-ENTMCNC: 30 PG — SIGNIFICANT CHANGE UP (ref 27–34)
MCHC RBC-ENTMCNC: 34.6 G/DL — SIGNIFICANT CHANGE UP (ref 32–36)
MCV RBC AUTO: 86.8 FL — SIGNIFICANT CHANGE UP (ref 80–100)
MONOCYTES # BLD AUTO: 0.94 K/UL — HIGH (ref 0–0.9)
MONOCYTES NFR BLD AUTO: 12.4 % — SIGNIFICANT CHANGE UP (ref 2–14)
NEUTROPHILS # BLD AUTO: 4.51 K/UL — SIGNIFICANT CHANGE UP (ref 1.8–7.4)
NEUTROPHILS NFR BLD AUTO: 59.3 % — SIGNIFICANT CHANGE UP (ref 43–77)
NRBC # BLD: 0 /100 WBCS — SIGNIFICANT CHANGE UP (ref 0–0)
PLATELET # BLD AUTO: 314 K/UL — SIGNIFICANT CHANGE UP (ref 150–400)
POTASSIUM SERPL-MCNC: 3.7 MMOL/L — SIGNIFICANT CHANGE UP (ref 3.5–5.3)
POTASSIUM SERPL-SCNC: 3.7 MMOL/L — SIGNIFICANT CHANGE UP (ref 3.5–5.3)
PROT SERPL-MCNC: 8 GM/DL — SIGNIFICANT CHANGE UP (ref 6–8.3)
RBC # BLD: 5.76 M/UL — SIGNIFICANT CHANGE UP (ref 4.2–5.8)
RBC # FLD: 12.7 % — SIGNIFICANT CHANGE UP (ref 10.3–14.5)
SODIUM SERPL-SCNC: 137 MMOL/L — SIGNIFICANT CHANGE UP (ref 135–145)
WBC # BLD: 7.6 K/UL — SIGNIFICANT CHANGE UP (ref 3.8–10.5)
WBC # FLD AUTO: 7.6 K/UL — SIGNIFICANT CHANGE UP (ref 3.8–10.5)

## 2022-09-05 PROCEDURE — 99285 EMERGENCY DEPT VISIT HI MDM: CPT

## 2022-09-05 PROCEDURE — 93010 ELECTROCARDIOGRAM REPORT: CPT

## 2022-09-05 RX ORDER — SODIUM CHLORIDE 9 MG/ML
1000 INJECTION, SOLUTION INTRAVENOUS ONCE
Refills: 0 | Status: COMPLETED | OUTPATIENT
Start: 2022-09-05 | End: 2022-09-05

## 2022-09-05 RX ORDER — SUCRALFATE 1 G
1 TABLET ORAL ONCE
Refills: 0 | Status: DISCONTINUED | OUTPATIENT
Start: 2022-09-05 | End: 2022-09-05

## 2022-09-05 RX ORDER — ONDANSETRON 8 MG/1
4 TABLET, FILM COATED ORAL ONCE
Refills: 0 | Status: COMPLETED | OUTPATIENT
Start: 2022-09-05 | End: 2022-09-05

## 2022-09-05 RX ORDER — FAMOTIDINE 10 MG/ML
1 INJECTION INTRAVENOUS
Qty: 7 | Refills: 0
Start: 2022-09-05 | End: 2022-09-11

## 2022-09-05 RX ORDER — FAMOTIDINE 10 MG/ML
20 INJECTION INTRAVENOUS ONCE
Refills: 0 | Status: COMPLETED | OUTPATIENT
Start: 2022-09-05 | End: 2022-09-05

## 2022-09-05 RX ADMIN — FAMOTIDINE 20 MILLIGRAM(S): 10 INJECTION INTRAVENOUS at 18:22

## 2022-09-05 RX ADMIN — SODIUM CHLORIDE 1000 MILLILITER(S): 9 INJECTION, SOLUTION INTRAVENOUS at 18:22

## 2022-09-05 RX ADMIN — SODIUM CHLORIDE 1000 MILLILITER(S): 9 INJECTION, SOLUTION INTRAVENOUS at 19:35

## 2022-09-05 RX ADMIN — ONDANSETRON 4 MILLIGRAM(S): 8 TABLET, FILM COATED ORAL at 18:22

## 2022-09-05 NOTE — ED PROVIDER NOTE - NS ED ROS FT
Constitutional:  (-) fever, (-) chills, (-) fatigue  Eyes:  (-) eye pain (-) visual changes  ENMT: (-) nasal discharge, (-) sore throat. (-) neck pain or stiffness  Cardiac: (-) chest pain (+) palpitations  Respiratory:  (-) cough (-) shortness of breath  GI:  (+) nausea (+) vomiting (-) diarrhea (+) abdominal pain  :  (-) dysuria (-) frequency (-) burning  MS:  (-) back pain (-) joint pain  Neuro:  (-) headache (-) numbness (-) tingling (-) focal weakness  Skin:  (-) rash  Except as documented in the HPI,  all other systems are negative

## 2022-09-05 NOTE — ED PROVIDER NOTE - NS ED ATTENDING STATEMENT MOD
I have seen and examined this patient and fully participated in the care of this patient as the teaching attending.  The service was shared with the ALFONSO.  I reviewed and verified the documentation and independently performed the documented:

## 2022-09-05 NOTE — ED ADULT TRIAGE NOTE - AS HEIGHT TYPE
5/20/2019    Patient presents today for:   Chief Complaint   Patient presents with   • Blood Pressure       Medications:  Current Outpatient Medications   Medication Sig   • enalapril (VASOTEC) 10 MG tablet 10 mg.   • fenofibrate (TRICOR) 145 MG tablet 1/2 tab PO Q Daily   • cyclobenzaprine (FLEXERIL) 10 MG tablet Take 1 tablet by mouth 2 times daily as needed for Muscle spasms.   • HYDROcodone-acetaminophen (NORCO) 5-325 MG per tablet Take 1 tablet by mouth every 8 hours as needed for Pain.     No current facility-administered medications for this visit.        Last dose of blood pressure medication taken at: Pt has not taken his prescribed bp med for two days.    Visit Vitals  /90 (BP Location: RUE, Patient Position: Sitting)   Pulse 80   Resp 18   SpO2 98%       Results discussed with Kaiser Vides MD and advised the following:  Take medication for bp and have bp check repeated. Pt in agreement and reports understanding.      Darrell Durbin RN            
stated

## 2022-09-05 NOTE — ED PROVIDER NOTE - CLINICAL SUMMARY MEDICAL DECISION MAKING FREE TEXT BOX
43 yo M with hx of HTN, tachycardia (on metoprolol) presenting with palpitations, n/v and epigastric discomfort x 2 days. 43 yo M with hx of HTN, tachycardia (on metoprolol) presenting with palpitations, n/v and epigastric discomfort x 2 days following weekend of heavy alcohol ingestion. Suspect alcohol gastritis. Will eval for elyte abnormality, lipase, fluids, symptom control and reassess.

## 2022-09-05 NOTE — ED PROVIDER NOTE - NSFOLLOWUPINSTRUCTIONS_ED_ALL_ED_FT
1. You presented to the emergency department for: VOMITING.    2. Your evaluation in the emergency department included a physician evaluation. Your work-up did not reveal any findings indicating the need for admission to the hospital or any emergent interventions at this time.     3. It is recommended that you follow-up with your primary doctor within 48 hours.     If needed, to arrange an appointment with a primary care provider please call: [phone]    4. Please continue taking your regular medications as prescribed.     Take FAMOTIDINE (PEPCID) as directed.     Avoid excessive alcohol, caffeine, spicy or fried foods.     5. PLEASE RETURN TO THE EMERGENCY DEPARTMENT IMMEDIATELY IF you develop any fevers not responding to over the counter medications, uncontrollable nausea and vomiting, an inability to tolerate eating and drinking, difficulty breathing, chest pain, a severe increase in your symptoms or pain, or any other new symptoms that concern you.

## 2022-09-05 NOTE — ED ADULT NURSE REASSESSMENT NOTE - NS ED NURSE REASSESS COMMENT FT1
received report from KAYLEE Gonzalez. pt on the bed alert and oriented pt identified self introduced. IV on the R AC> hooked to cardiac monitor. vitals signs taken and recorded. as per pt pain level is 4/10 better than what it was as he said. pending blood test result.

## 2022-09-05 NOTE — ED PROVIDER NOTE - PHYSICAL EXAMINATION
Gen: NAD, AAOx3, non-toxic appearing  HEENT: NCAT, normal conjunctiva, oral mucosa moist  Lung: speaking in full sentences, good aeration bilaterally, lungs CTA b/l  CV: regular rate and rhythm. cap refill <2x. peripheral pulses 2+bilaterally   Abd: soft, ND, mild epigastric tenderness, no guarding  MSK: no visible deformities  Neuro: No focal deficits  Skin: Intact  Psych: normal affect

## 2022-09-05 NOTE — ED PROVIDER NOTE - OBJECTIVE STATEMENT
41 yo M with hx of HTN, tachycardia (on metoprolol) presenting with palpitations, n/v and epigastric discomfort x 2 days. Patient had been drinking alcohol heavily over the weekend. Now with burning sensation in the epigastric region, unable to tolerate PO. Also reports heart racing which has happened in the past. Has been able to keep down his medications.

## 2022-09-05 NOTE — ED ADULT NURSE NOTE - OBJECTIVE STATEMENT
vomiting x 3 days no diarrhea fever or chills also reports LLQ abd pain pt c/o vomiting x 3 days no diarrhea fever or chills also reports LLQ abd pain , denies any changes to food or medications hx htn

## 2022-09-05 NOTE — ED PROVIDER NOTE - ATTENDING CONTRIBUTION TO CARE
Dr. ORVILLE Huber MD: I performed a face to face bedside interview with patient regarding history of present illness, review of symptoms and past medical history. I completed an independent physical exam.  I have discussed patient's plan of care with resident. I agree with note as stated above, having amended the EMR as needed to reflect my findings.   This includes HISTORY OF PRESENT ILLNESS, HIV, PAST MEDICAL/SURGICAL/FAMILY/SOCIAL HISTORY, ALLERGIES AND HOME MEDICATIONS, REVIEW OF SYSTEMS, PHYSICAL EXAM, and any PROGRESS NOTES during the time I functioned as the attending physician for this patient.    Pt hx etoh dependence, drank alcohol presenting with epigastric abdominal pain a/w nausea/vomiting;  No hematemesis or hematochezia/melena. Pt is hemodynamically stable, mentating well. Exam concerning for alcoholic gastritis. Doubt ACS as EKG w/ NO LEOBARDO/STD, or significant abdominal pathology. No melena.    Considered DDx but unlikely due to the clinical presentation and exam:  ACS,  Inflammatory bowel disease, Irritable Bowel Syndrome,   AAA rupture,  dissection,  viscous perforation,  gastroenteritis,  PUDperforated ulcer,  DKA,  urolithiasis,  UTI/cystitis/pyelonephritis.  PLAN:  -IV fluids, analgesia & anti-emetics PRN  -EKG,  cardiac monitor, IVF; CBC, CMP, lipase  -Observation and reassessment, surgical consultation if necessary.

## 2022-09-05 NOTE — ED PROVIDER NOTE - PATIENT PORTAL LINK FT
You can access the FollowMyHealth Patient Portal offered by [facility] by registering at the following website: [email or web address]. By joining [employer]’s FollowMyHealth portal, you will also be able to view your health information using other applications (apps) compatible with our system.

## 2022-09-05 NOTE — ED PROVIDER NOTE - PROGRESS NOTE DETAILS
HAMLET MCCOY: no abdominal ttp on exam, subjective improvement. Denies any chest pain, shortness of breath, nausea/vomiting in the ED. Pending labs. EKG w/ no ramon/std, doubt ACS. Stacy, PGY3: Patient reevaluated, symptoms improved, tolerating PO. Discussed lab findings with patient. Patient feels comfortable going home. Gave home care and follow up instructions. Discussed which symptoms to look out for and when to return to the ED for further evaluation. Patient given opportunity to ask questions about their medical condition and had all questions answered.

## 2022-09-05 NOTE — ED ADULT TRIAGE NOTE - CHIEF COMPLAINT QUOTE
heart beating fast, pain to left abdomen with vomiting, states he was drinking [place] ice tea x 3 days

## 2022-09-28 ENCOUNTER — INPATIENT (INPATIENT)
Facility: HOSPITAL | Age: 42
LOS: 1 days | Discharge: HOME | End: 2022-09-30
Attending: GENERAL ACUTE CARE HOSPITAL | Admitting: GENERAL ACUTE CARE HOSPITAL

## 2022-09-28 VITALS
WEIGHT: 169.98 LBS | RESPIRATION RATE: 20 BRPM | HEIGHT: 67 IN | DIASTOLIC BLOOD PRESSURE: 94 MMHG | SYSTOLIC BLOOD PRESSURE: 135 MMHG | HEART RATE: 140 BPM | TEMPERATURE: 98 F | OXYGEN SATURATION: 96 %

## 2022-09-28 DIAGNOSIS — F10.20 ALCOHOL DEPENDENCE, UNCOMPLICATED: ICD-10-CM

## 2022-09-28 DIAGNOSIS — N17.9 ACUTE KIDNEY FAILURE, UNSPECIFIED: ICD-10-CM

## 2022-09-28 DIAGNOSIS — I10 ESSENTIAL (PRIMARY) HYPERTENSION: ICD-10-CM

## 2022-09-28 DIAGNOSIS — R11.2 NAUSEA WITH VOMITING, UNSPECIFIED: ICD-10-CM

## 2022-09-28 DIAGNOSIS — K92.0 HEMATEMESIS: ICD-10-CM

## 2022-09-28 DIAGNOSIS — R79.89 OTHER SPECIFIED ABNORMAL FINDINGS OF BLOOD CHEMISTRY: ICD-10-CM

## 2022-09-28 LAB
ALBUMIN SERPL ELPH-MCNC: 3.8 G/DL — SIGNIFICANT CHANGE UP (ref 3.3–5)
ALP SERPL-CCNC: 130 U/L — HIGH (ref 40–120)
ALT FLD-CCNC: 65 U/L — SIGNIFICANT CHANGE UP (ref 12–78)
ANION GAP SERPL CALC-SCNC: 13 MMOL/L — SIGNIFICANT CHANGE UP (ref 5–17)
AST SERPL-CCNC: 68 U/L — HIGH (ref 15–37)
BASOPHILS # BLD AUTO: 0.08 K/UL — SIGNIFICANT CHANGE UP (ref 0–0.2)
BASOPHILS NFR BLD AUTO: 1 % — SIGNIFICANT CHANGE UP (ref 0–2)
BILIRUB SERPL-MCNC: 0.5 MG/DL — SIGNIFICANT CHANGE UP (ref 0.2–1.2)
BLD GP AB SCN SERPL QL: SIGNIFICANT CHANGE UP
BUN SERPL-MCNC: 3 MG/DL — LOW (ref 7–23)
CALCIUM SERPL-MCNC: 9.6 MG/DL — SIGNIFICANT CHANGE UP (ref 8.5–10.1)
CHLORIDE SERPL-SCNC: 105 MMOL/L — SIGNIFICANT CHANGE UP (ref 96–108)
CO2 SERPL-SCNC: 24 MMOL/L — SIGNIFICANT CHANGE UP (ref 22–31)
CREAT SERPL-MCNC: 1.21 MG/DL — SIGNIFICANT CHANGE UP (ref 0.5–1.3)
EGFR: 76 ML/MIN/1.73M2 — SIGNIFICANT CHANGE UP
EOSINOPHIL # BLD AUTO: 0.12 K/UL — SIGNIFICANT CHANGE UP (ref 0–0.5)
EOSINOPHIL NFR BLD AUTO: 1.5 % — SIGNIFICANT CHANGE UP (ref 0–6)
ETHANOL SERPL-MCNC: 102 MG/DL — HIGH (ref 0–10)
FLUAV AG NPH QL: SIGNIFICANT CHANGE UP
FLUBV AG NPH QL: SIGNIFICANT CHANGE UP
GLUCOSE SERPL-MCNC: 172 MG/DL — HIGH (ref 70–99)
HCT VFR BLD CALC: 50.1 % — HIGH (ref 39–50)
HGB BLD-MCNC: 17.5 G/DL — HIGH (ref 13–17)
IMM GRANULOCYTES NFR BLD AUTO: 0.5 % — SIGNIFICANT CHANGE UP (ref 0–0.9)
INR BLD: 1.02 RATIO — SIGNIFICANT CHANGE UP (ref 0.88–1.16)
LACTATE SERPL-SCNC: 3.1 MMOL/L — HIGH (ref 0.7–2)
LACTATE SERPL-SCNC: 3.5 MMOL/L — HIGH (ref 0.7–2)
LACTATE SERPL-SCNC: 4 MMOL/L — CRITICAL HIGH (ref 0.7–2)
LACTATE SERPL-SCNC: 6 MMOL/L — CRITICAL HIGH (ref 0.7–2)
LIDOCAIN IGE QN: 558 U/L — HIGH (ref 73–393)
LYMPHOCYTES # BLD AUTO: 2.55 K/UL — SIGNIFICANT CHANGE UP (ref 1–3.3)
LYMPHOCYTES # BLD AUTO: 31.9 % — SIGNIFICANT CHANGE UP (ref 13–44)
MCHC RBC-ENTMCNC: 29.8 PG — SIGNIFICANT CHANGE UP (ref 27–34)
MCHC RBC-ENTMCNC: 34.9 G/DL — SIGNIFICANT CHANGE UP (ref 32–36)
MCV RBC AUTO: 85.2 FL — SIGNIFICANT CHANGE UP (ref 80–100)
MONOCYTES # BLD AUTO: 0.7 K/UL — SIGNIFICANT CHANGE UP (ref 0–0.9)
MONOCYTES NFR BLD AUTO: 8.8 % — SIGNIFICANT CHANGE UP (ref 2–14)
NEUTROPHILS # BLD AUTO: 4.51 K/UL — SIGNIFICANT CHANGE UP (ref 1.8–7.4)
NEUTROPHILS NFR BLD AUTO: 56.3 % — SIGNIFICANT CHANGE UP (ref 43–77)
NRBC # BLD: 0 /100 WBCS — SIGNIFICANT CHANGE UP (ref 0–0)
PLATELET # BLD AUTO: 381 K/UL — SIGNIFICANT CHANGE UP (ref 150–400)
POTASSIUM SERPL-MCNC: 3.3 MMOL/L — LOW (ref 3.5–5.3)
POTASSIUM SERPL-SCNC: 3.3 MMOL/L — LOW (ref 3.5–5.3)
PROT SERPL-MCNC: 8.5 GM/DL — HIGH (ref 6–8.3)
PROTHROM AB SERPL-ACNC: 12.2 SEC — SIGNIFICANT CHANGE UP (ref 10.5–13.4)
RBC # BLD: 5.88 M/UL — HIGH (ref 4.2–5.8)
RBC # FLD: 12.1 % — SIGNIFICANT CHANGE UP (ref 10.3–14.5)
SARS-COV-2 RNA SPEC QL NAA+PROBE: SIGNIFICANT CHANGE UP
SODIUM SERPL-SCNC: 142 MMOL/L — SIGNIFICANT CHANGE UP (ref 135–145)
TROPONIN I, HIGH SENSITIVITY RESULT: 5.9 NG/L — SIGNIFICANT CHANGE UP
WBC # BLD: 8 K/UL — SIGNIFICANT CHANGE UP (ref 3.8–10.5)
WBC # FLD AUTO: 8 K/UL — SIGNIFICANT CHANGE UP (ref 3.8–10.5)

## 2022-09-28 PROCEDURE — 74177 CT ABD & PELVIS W/CONTRAST: CPT | Mod: 26,MA

## 2022-09-28 PROCEDURE — 93010 ELECTROCARDIOGRAM REPORT: CPT

## 2022-09-28 PROCEDURE — 99223 1ST HOSP IP/OBS HIGH 75: CPT

## 2022-09-28 PROCEDURE — 99285 EMERGENCY DEPT VISIT HI MDM: CPT

## 2022-09-28 RX ORDER — DIAZEPAM 5 MG
5 TABLET ORAL ONCE
Refills: 0 | Status: DISCONTINUED | OUTPATIENT
Start: 2022-09-28 | End: 2022-09-28

## 2022-09-28 RX ORDER — SODIUM CHLORIDE 9 MG/ML
1000 INJECTION, SOLUTION INTRAVENOUS
Refills: 0 | Status: DISCONTINUED | OUTPATIENT
Start: 2022-09-28 | End: 2022-09-30

## 2022-09-28 RX ORDER — SODIUM CHLORIDE 9 MG/ML
1000 INJECTION INTRAMUSCULAR; INTRAVENOUS; SUBCUTANEOUS ONCE
Refills: 0 | Status: COMPLETED | OUTPATIENT
Start: 2022-09-28 | End: 2022-09-28

## 2022-09-28 RX ORDER — PANTOPRAZOLE SODIUM 20 MG/1
40 TABLET, DELAYED RELEASE ORAL EVERY 12 HOURS
Refills: 0 | Status: DISCONTINUED | OUTPATIENT
Start: 2022-09-28 | End: 2022-09-30

## 2022-09-28 RX ORDER — LANOLIN ALCOHOL/MO/W.PET/CERES
3 CREAM (GRAM) TOPICAL AT BEDTIME
Refills: 0 | Status: DISCONTINUED | OUTPATIENT
Start: 2022-09-28 | End: 2022-09-30

## 2022-09-28 RX ORDER — ACETAMINOPHEN 500 MG
650 TABLET ORAL EVERY 6 HOURS
Refills: 0 | Status: DISCONTINUED | OUTPATIENT
Start: 2022-09-28 | End: 2022-09-30

## 2022-09-28 RX ORDER — ONDANSETRON 8 MG/1
4 TABLET, FILM COATED ORAL ONCE
Refills: 0 | Status: COMPLETED | OUTPATIENT
Start: 2022-09-28 | End: 2022-09-28

## 2022-09-28 RX ORDER — THIAMINE MONONITRATE (VIT B1) 100 MG
100 TABLET ORAL DAILY
Refills: 0 | Status: DISCONTINUED | OUTPATIENT
Start: 2022-09-28 | End: 2022-09-30

## 2022-09-28 RX ORDER — POTASSIUM CHLORIDE 20 MEQ
20 PACKET (EA) ORAL ONCE
Refills: 0 | Status: COMPLETED | OUTPATIENT
Start: 2022-09-28 | End: 2022-09-28

## 2022-09-28 RX ORDER — FOLIC ACID 0.8 MG
1 TABLET ORAL DAILY
Refills: 0 | Status: DISCONTINUED | OUTPATIENT
Start: 2022-09-28 | End: 2022-09-30

## 2022-09-28 RX ORDER — METOCLOPRAMIDE HCL 10 MG
10 TABLET ORAL ONCE
Refills: 0 | Status: COMPLETED | OUTPATIENT
Start: 2022-09-28 | End: 2022-09-28

## 2022-09-28 RX ORDER — ONDANSETRON 8 MG/1
4 TABLET, FILM COATED ORAL EVERY 6 HOURS
Refills: 0 | Status: DISCONTINUED | OUTPATIENT
Start: 2022-09-28 | End: 2022-09-30

## 2022-09-28 RX ORDER — PANTOPRAZOLE SODIUM 20 MG/1
80 TABLET, DELAYED RELEASE ORAL ONCE
Refills: 0 | Status: COMPLETED | OUTPATIENT
Start: 2022-09-28 | End: 2022-09-28

## 2022-09-28 RX ORDER — AMLODIPINE BESYLATE 2.5 MG/1
10 TABLET ORAL DAILY
Refills: 0 | Status: DISCONTINUED | OUTPATIENT
Start: 2022-09-28 | End: 2022-09-30

## 2022-09-28 RX ADMIN — Medication 50 MILLIEQUIVALENT(S): at 21:33

## 2022-09-28 RX ADMIN — Medication 100 MILLIGRAM(S): at 18:49

## 2022-09-28 RX ADMIN — ONDANSETRON 4 MILLIGRAM(S): 8 TABLET, FILM COATED ORAL at 09:06

## 2022-09-28 RX ADMIN — SODIUM CHLORIDE 1000 MILLILITER(S): 9 INJECTION INTRAMUSCULAR; INTRAVENOUS; SUBCUTANEOUS at 10:42

## 2022-09-28 RX ADMIN — SODIUM CHLORIDE 1000 MILLILITER(S): 9 INJECTION INTRAMUSCULAR; INTRAVENOUS; SUBCUTANEOUS at 10:50

## 2022-09-28 RX ADMIN — AMLODIPINE BESYLATE 10 MILLIGRAM(S): 2.5 TABLET ORAL at 18:49

## 2022-09-28 RX ADMIN — PANTOPRAZOLE SODIUM 80 MILLIGRAM(S): 20 TABLET, DELAYED RELEASE ORAL at 09:07

## 2022-09-28 RX ADMIN — Medication 10 MILLIGRAM(S): at 11:34

## 2022-09-28 RX ADMIN — Medication 1 TABLET(S): at 18:48

## 2022-09-28 RX ADMIN — Medication 5 MILLIGRAM(S): at 11:42

## 2022-09-28 RX ADMIN — SODIUM CHLORIDE 2000 MILLILITER(S): 9 INJECTION INTRAMUSCULAR; INTRAVENOUS; SUBCUTANEOUS at 09:07

## 2022-09-28 RX ADMIN — Medication 5 MILLIGRAM(S): at 13:15

## 2022-09-28 RX ADMIN — SODIUM CHLORIDE 1000 MILLILITER(S): 9 INJECTION INTRAMUSCULAR; INTRAVENOUS; SUBCUTANEOUS at 12:38

## 2022-09-28 RX ADMIN — PANTOPRAZOLE SODIUM 40 MILLIGRAM(S): 20 TABLET, DELAYED RELEASE ORAL at 18:49

## 2022-09-28 RX ADMIN — Medication 1 MILLIGRAM(S): at 18:48

## 2022-09-28 NOTE — CONSULT NOTE ADULT - SUBJECTIVE AND OBJECTIVE BOX
full consult dictated    Plan:  Pt admitted with n/v; had a small amount of bleeding; H/H stable. Pt likely with alcoholic gastritis and pancreatitis (elevated Lipase).  Pt will be given clear liquids; started on IV protonix; Lipase in AM. Pt should have EGD - which can be done on an outpatient basis.

## 2022-09-28 NOTE — ED PROVIDER NOTE - PHYSICAL EXAMINATION
General: appears intoxicated, retching on exam  HEENT: NCAT, Neck supple without meningismus, PERRL, no conjunctival injection  Lungs: CTAB, No wheeze or crackles, No retractions, No increased work of breathing  Heart: S1S2 fast rate,No M/R/G, Pules equal Bilaterally in upper and lower extremities distally  Abd: soft, LUQ pain, no signs of ascites, No guarding, No rebound.  No hernias, no palpable masses.  Extrem: FROM in all joints, no gross deformities appreciated, no significant edema noted, No ulcers. Cap refil < 2sec.  Skin: No rash noted, warm dry, no cirrhosis or jaundice  Neuro:  Grossly normal.  No difficulty ambulating. No focal deficits.  Psychiatric: Appropriate mood and affect.

## 2022-09-28 NOTE — ED PROVIDER NOTE - CADM POA CENTRAL LINE
AMBULATORY PROGRESS NOTE      Patient: Linda Cardenas             MRN: [id number]     SSN: [id number] Body mass index is 29.35 kg/(m^2). YOB: 1955     AGE: 60 y.o. EX: female    PCP: Roe Renee DO    IMPRESSION/DIAGNOSIS AND TREATMENT PLAN     DIAGNOSES  1. Closed nondisplaced fracture of proximal phalanx of left great toe, initial encounter    2. Contusion of left foot, initial encounter    3. Great toe pain, left        Orders Placed This Encounter    [80780] Foot Min 3V    HYDROcodone-acetaminophen (NORCO) 5-325 mg per tablet      Linda Cardenas understands her diagnoses and the proposed plan. Plan:    1) Continue using a hard-sole shoe. 2) Norco 5 m-2 PO BIDPRN; 30 tablets, 0 refills. RTO - 2 weeks    HPI AND EXAMINATION     Cha Hutchinson IS A 60 y.o. female who presents to my outpatient office for a follow up visit involving hand pain. At last visit, I provided a referral for Hand surgery and aspirated fluid from her left wrist.    Patient reports that she recently tripped and fell. She hurt her left great toe and arrives to the office using a hard-sole shoe. XR imaging has been reviewed with the patient that shows a possible hairline crack of her left great toe. She was offered a CAM walker boot but would like to remain in the hard-sole shoe. Due to her h/o GI ulcers she cannot take NSAIDs. Patient states she is doing well otherwise. Patient has a h/o Gastric Ulcers.     Left ANKLE and FOOT       Gait: slow  Tenderness: severe tenderness to left great toe. .   Cutaneous: No rashes, skin patches, wounds, or abrasions to the lower legs           Warm and Normal color. No regions of expressible drainage. Medial Border of Tibia Region: absent           Skin color, texture, turgor normal. Normal.           Moderate swelling to the left great toe. Ecchymosis to the left great toe.   Joint Motion: ROM Ankle:Normal , Hindfoot: (ST,TN,CC Normal}, Forefoot toes:Normal  Neurologic Exam: Neuro: Motor: normal 5/5 strength in all tested muscle groups and Sensory : no sensory deficits noted. Contractures: Gastrocnemius or Achilles Contractures absent  Joint / Tendon Stability: No Ankle or Subtalar instability or joint laxity. No peroneal sublux ability or dislocation  Alignment: Slight Pes Planus  Vascular: Normal Pulses/ NL Capillary refill, No evidence of DVT seen on physical exam.   No calf swelling, no tenderness to calf muscles. Lymphatic:  No Evidence of Lymphedema. CHART REVIEW     Past Medical History:   Diagnosis Date    Annular tear of lumbar disc 11/10/2014    Asthma     Bronchitis    Benign tumor of throat     Bone spur     right ankle    Cervicalgia 11/10/2014    Chronic pain     back    Degeneration of lumbar or lumbosacral intervertebral disc 11/10/2014    Degenerative disc disease     Depression     Displacement of lumbar intervertebral disc without myelopathy 11/10/2014    Elevated white blood cell count     Fibromyalgia     GERD (gastroesophageal reflux disease)     Hypertension     Insomnia     Nausea & vomiting     Pain in joint, multiple sites 11/10/2014    Postlaminectomy syndrome, cervical region 11/10/2014    Sinus infection 10/5/15    Ulcer ([facility] [address]      Current Outpatient Prescriptions   Medication Sig    HYDROcodone-acetaminophen (NORCO) 5-325 mg per tablet Take 1-2 Tabs by mouth two (2) times daily as needed for Pain. Max Daily Amount: 4 Tabs.  polyethylene glycol (MIRALAX) 17 gram packet DISSOLVE 1 PACKET IN BEVERAGE OF CHOICE AND DRINK BY MOUTH ONCE DAILY    ergocalciferol (VITAMIN D2) 50,000 unit capsule TAKE ONE CAPSULE BY MOUTH EVERY 7 DAYS    morphine CR (MS CONTIN) 60 mg CR tablet Take 1 Tab by mouth every twelve (12) hours for 30 days. Max Daily Amount: 120 mg.    oxyCODONE IR (ROXICODONE) 10 mg tab immediate release tablet Take 1 Tab by mouth three (3) times daily as needed. Max Daily Amount: 30 mg.    baclofen (LIORESAL) 10 mg tablet TAKE ONE TABLET BY MOUTH THREE TIMES A DAY    atorvastatin (LIPITOR) 40 mg tablet TAKE ONE TABLET BY MOUTH ONE TIME DAILY    albuterol (PROAIR HFA) 90 mcg/actuation inhaler Take 2 Puffs by inhalation every four (4) hours as needed for Wheezing.  Dexlansoprazole (DEXILANT) 60 mg CpDB Take 1 Cap by mouth daily.  fluticasone (FLONASE) 50 mcg/actuation nasal spray 2 Sprays by Both Nostrils route as needed.  QUEtiapine (SEROQUEL) 200 mg tablet Take 200 mg by mouth daily.  losartan (COZAAR) 25 mg tablet TAKE ONE TABLET BY MOUTH ONE TIME DAILY    naloxone (NARCAN) 4 mg/actuation nasal spray 1 [place] by IntraNASal route once as needed. Use 1 spray intranasally into 1 nostril. Use a new Narcan nasal spray for subsequent doses and administer into alternating nostrils. May repeat every 2 to 3 minutes as needed.  polyethylene glycol (MIRALAX) 17 gram packet Take 17 g by mouth daily.  carbamide peroxide (DEBROX) 6.5 % otic solution Administer 5 Drops into each ear two (2) times a day.  valACYclovir (VALTREX) 500 mg tablet Take 1 Tab by mouth two (2) times a day.  temazepam (RESTORIL) 15 mg capsule Take 1 Cap by mouth nightly as needed for Sleep. Max Daily Amount: 15 mg. Indications: INSOMNIA    sucralfate (CARAFATE) 1 gram tablet 1 g.    albuterol (PROVENTIL VENTOLIN) 2.5 mg /3 mL (0.083 %) nebulizer solution 2.5 mg as needed.  gabapentin (NEURONTIN) 400 mg capsule 400 mg two (2) times a day.  ipratropium (ATROVENT) 0.02 % nebulizer solution 0.5 mg.    trazodone (DESYREL) 100 mg tablet Take 100 mg by mouth nightly.  sertraline (ZOLOFT) 100 mg tablet Take 100 mg by mouth nightly as needed.  [START ON 2/25/2018] morphine CR (MS CONTIN) 60 mg CR tablet Take 1 Tab by mouth every twelve (12) hours for 30 days.  Max Daily Amount: 120 mg.    [START ON 3/26/2018] morphine CR (MS CONTIN) 60 mg CR tablet Take 1 Tab by mouth every twelve (12) hours. Max Daily Amount: 120 mg.    [START ON 2/25/2018] oxyCODONE IR (ROXICODONE) 10 mg tab immediate release tablet Take 1 Tab by mouth three (3) times daily as needed. Max Daily Amount: 30 mg.    [START ON 3/26/2018] oxyCODONE IR (ROXICODONE) 10 mg tab immediate release tablet Take 1 Tab by mouth three (3) times daily as needed. Max Daily Amount: 30 mg.  polyethylene glycol (MIRALAX) 17 gram packet DISSOLVE CONTENTS OF ONE PACKET IN BEVERAGE ONCE A DAY     No current facility-administered medications for this visit. Allergies   Allergen Reactions    Aspirin Other (comments)     Stomach bleeding    Macrobid [Nitrofurantoin Monohyd/M-Cryst] Nausea and Vomiting    Nsaids (Non-Steroidal Anti-Inflammatory Drug) Nausea and Vomiting    Opana [Oxymorphone] Other (comments)     Insomnia, weight loss, nightmares    Pcn [Penicillins] Hives     Past Surgical History:   Procedure Laterality Date    COLONOSCOPY N/A 4/13/2017    COLONOSCOPY performed by Diann Veliz MD at [facility] ENDOSCOPY    HX CATARACT REMOVAL      HX CERVICAL FUSION      HX HYSTERECTOMY      HX ORTHOPAEDIC      ganglion cyst removed, right hand    HX OTHER SURGICAL      cyst left thigh removed    LAP,CHOLECYSTECTOMY N/A 02/27/2017    Dr. Schuyler David Not on file. Social History Main Topics    Smoking status: Former Smoker    Smokeless tobacco: Never Used    Alcohol use No      Comment: none in 24 years    Drug use: No      Comment: last smoked in 1993    Sexual activity: Not Currently     Family History   Problem Relation Age of Onset    Hypertension Other     Heart Attack Other     Heart Disease Other     Stroke Other     Headache Other     Seizures Other        REVIEW OF SYSTEMS : 2/7/2018  ALL BELOW ARE Negative except : SEE HPI       Constitutional: Negative for fever, chills and weight loss.  Neg Weigh Loss  Cardiovascular: Negative for chest pain, claudication and leg swelling. SOB, VILLAGOMEZ   Gastrointestinal: Negative for  pain, N/V/D/C, Blood in stool or urine,dysuria, hematuria,        Incontinence, pelvic pain  Musculoskeletal: see HPI. Neurological: Negative for dizziness and weakness. Negative for headaches,Visual Changes, Confusion, Seizures,   Psychiatric/Behavioral: Negative for depression, memory loss and substance abuse. Extremities:  Negative for  hair changes, rash or skin lesion changes. Hematologic: Negative for Bleeding problems, bruising, pallor or swollen lymph nodes. Peripheral Vascular: No calf pain, vascular vein tenderness to calf pain              No calf throbbing, posterior knee throbbing pain    DIAGNOSTIC IMAGING      Dictation on: 02/07/2018 11:13 AM by: Fuentes Penny [[id number]]     X RAYS AT [address] [place] [address]    TOE X RAYS 3 VIEWS { Left Great Toe  2/7/2018    Bones and Joints: YES /// GREAT TOE P1 FX AT GREAT TOE IP  (LATERAL)     No subluxations, or No dislocations  Alignment: Normal   Soft Tissues: Normal, calcific densities to soft tissues not present to main blood vessels,not present to non blood vessel locations :   Joints: Arthritis:  mild present   (2/3 TMT)  Mineralization: suggests  minimal Osteopenia    I have personally reviewed the results of the above study. The interpretation of this study is my professional opinion. Estuardo Ogden MD  2/7/2018  11:33 AM                   Written by Marcus West, as dictated by Francisco Kraus MD. IDr., Francisco Kraus MD, confirm that all documentation is accurate. No

## 2022-09-28 NOTE — ED ADULT NURSE REASSESSMENT NOTE - ANCILLARY STATUS
Refill request for losartan and sertraline. Last seen 7/16/19;  Last filled 7/16/19. Refilled per protocol. Next appointment scheduled for 2/6/2020. lab results pending

## 2022-09-28 NOTE — H&P ADULT - NSHPPHYSICALEXAM_GEN_ALL_CORE
CONSTITUTIONAL: Well developed, well nourished, alert and cooperative, no acute distress.   EYES: PERRL,  no scleral icterus  ENT: Mucosa and tongue appear dry   NECK: Neck supple, trachea midline, non-tender  CARDIAC: Normal S1 and S2. Regular rate and rhythms. No murmurs. No Pedal edema. Peripheral pulses intact  LUNGS: Equal air entry both lungs. No rales, rhonchi, wheezing. Normal respiratory effort.   ABDOMEN: Slight left upper abd tenderness+. No guarding or rebound tenderness. No hepatomegaly or splenomegaly. Bowel sound normal  MUSCULOSKELETAL: Normocephalic, atraumatic. No significant deformity or joint abnormality  NEUROLOGICAL: No gross motor or sensory deficits. CN II-XII grossly intact  SKIN: no lesions or eruptions. Decrease turgor  PSYCHIATRIC: A&O x 3, appropriate mood and affect. Normal insight and judgement.

## 2022-09-28 NOTE — ED PROVIDER NOTE - CLINICAL SUMMARY MEDICAL DECISION MAKING FREE TEXT BOX
Pt retching on exam, small streaks of blood, appeaers intoxicated (last drink 4 hrs ago). Tachy to 140s (pt has hx of tachycarida on metropolol), not on AC. Clinical presentation likely 2/2 alcoholic gastritis vs AKA vs pancreatitis causing jaxon morales. Less likely PUD. Low suspicion for varices given no hx of cirrhosis.    Will get GI bleed labs, PPI, zofran, IVFand reassess

## 2022-09-28 NOTE — ED PROVIDER NOTE - NS ED ROS FT
CONST: no fevers, no chills  EYES: no pain, no vision changes  ENT: no sore throat, no ear pain, no change in hearing  CV: no chest pain, no leg swelling  RESP: no shortness of breath, no cough  ABD: (+) abdominal pain, vomiting  : no dysuria, no flank pain, no hematuria  MSK: no back pain, no extremity pain  NEURO: no headache or additional neurologic complaints  HEME: no easy bleeding  SKIN:  no rash

## 2022-09-28 NOTE — H&P ADULT - HISTORY OF PRESENT ILLNESS
42 years old male with h/o HTN, alcohol use present to ED with complain of nausea, vomiting for 2 days. Patient reported multiple episode of vomiting for last 2 days. Reportedly had 2 episode of vomiting with dark red blood, associated with left upper abdominal pain. Normally drink a few times a week but for last 1 week, patient has been drinking 1L of liquor daily for last week.   Tachycardic to 140, BP stable, afebrile, sat well at RA. EKG with sinus tachy, . No leukocytosis, hb 17.5, lactate 4-->6-->3.1, K 3.3, Cr 1.21 ( 0.98 on 9/5/2022), lipase 558, serum alcohol 102. CT abd/pelvis with hepatic steatosis, distal esophageal wall thickening.     SH: alcohol use  FH: no family h/o HTN, DM

## 2022-09-28 NOTE — ED PROVIDER NOTE - NS ED MD DISPO SPECIAL CONSIDERATION1
[FreeTextEntry1] : Assessment/Plan:\par  22 year male with a history of migraines, presents with 8 days of persistent left sided migraines with nausea and photophobia; he was also seen at urgent care recently for this headache. His usual migraine frequency is 1/month. \par \par Migraine without aura, status migrainous (migraine possibly triggered in the setting of seasonal allergy):\par 1. Will treat with 3 days of migraine abortive therapy as follow:\par - Benadryl 25 mg and Compazine 10 mg q8 hour as needed for headaches\par - Patient advised to call clinic by Monday if headache not relieved by above\par \par Headache education provided:\par [] Stay well hydrated\par [] Limit excessive caffeine and alcohol intake\par [] Maintain good sleep hygiene\par [] Try to avoid triggers\par [] Practice good eating habits\par [] Avoid excessive use of over the counter pain medications, as they can cause medication overuse headaches \par [] Keep a headache diary\par \par \par f/u 4 weeks\par \par REMY SALES was provided education and counselling on current diagnosis/symptoms, diagnostic work up, treatment options and potential side effects of prescribed therapy/therapies. He was advised to call our clinic at [phone] for any new or worsening symptoms, or with any questions or concerns. REMY SALES expressed understanding and all his questions were answered.\par 
None

## 2022-09-28 NOTE — H&P ADULT - PROBLEM SELECTOR PLAN 6
Librium 25mg q6hr prn for alcohol withdrawal  thiamine, folic acid, multivitamin  Monitor for alcohol withdrawal

## 2022-09-28 NOTE — ED PROVIDER NOTE - OBJECTIVE STATEMENT
41 y/o M w/ pmh of etoh abuse, HTN, tachycardia p/w vomiting x 1 day. Pt states he has vomited 10 times, last two times noticed bleeding. Pt has been to ED for this before. Denies melena or diarrhea. Pt endorses heavy alcohol use, 1L vodka per day which is more than his usual. endorses LUQ pain. No hx of varices or PUD per pt, but has enver had endoscopy

## 2022-09-28 NOTE — ED ADULT TRIAGE NOTE - CHIEF COMPLAINT QUOTE
Vomiting with blood tinged vomitus, drinking alcohol 3 days straight, active vomiting at triage , last drink today [place] [employer] Tea

## 2022-09-28 NOTE — ED PROVIDER NOTE - EKG ADDITIONAL INFORMATION FREE TEXT
As interpreted by ED physician, ECG is sinus tachy with normal intervals/axis, no changes in QRS, no ST/T changes, no signs of Brugada, epsilon or delta wave, HCOM, QT prolongation, S1Q3T3.

## 2022-09-28 NOTE — ED ADULT NURSE NOTE - OBJECTIVE STATEMENT
Vomiting with blood tinged vomitus, drinking alcohol 3 days straight, active vomiting , last drink today [place] [employer]

## 2022-09-28 NOTE — H&P ADULT - PROBLEM SELECTOR PLAN 3
lactate 4-->6-->3.1  elevated Hb, suggesting hemoconcentration.  Appear dry, likely due to dehydration  Continue IV fluid  Repeat lactate in AM

## 2022-09-28 NOTE — ED ADULT NURSE REASSESSMENT NOTE - COMFORT CARE
assisted to bathroom/ambulated to bathroom/darkened lights/plan of care explained/repositioned/side rails up/warm blanket provided

## 2022-09-28 NOTE — ED PROVIDER NOTE - PROGRESS NOTE DETAILS
Tremors now improved, HR improved. will admit to medicine for eoth withdrawal. now tolerating PO. CT shows esophagitis

## 2022-09-28 NOTE — H&P ADULT - PROBLEM SELECTOR PLAN 1
reported 2 episode of dark blood vomiting  ? Georgina Ficth syndrome vs gastritis  IV pantoprazole 40mg bid  Monitor H/H  IV fluid  GI consulted- Dr Young

## 2022-09-28 NOTE — H&P ADULT - ASSESSMENT
42 years old male with h/o HTN, alcohol use present to ED with complain of nausea, vomiting for 2 days. Patient reported multiple episode of vomiting for last 2 days. Reportedly had 2 episode of vomiting with dark red blood, associated with left upper abdominal pain. Normally drink a few times a week but for last 1 week, patient has been drinking 1L of liquor daily for last week.   Tachycardic to 140, BP stable, afebrile, sat well at RA. EKG with sinus tachy, . No leukocytosis, hb 17.5, lactate 4-->6-->3.1, K 3.3, Cr 1.21 ( 0.98 on 9/5/2022), lipase 558, serum alcohol 102. CT abd/pelvis with hepatic steatosis, distal esophageal wall thickening.       Admitted with hematemesis, intractable nausea, vomiting

## 2022-09-29 LAB
ALBUMIN SERPL ELPH-MCNC: 3.4 G/DL — SIGNIFICANT CHANGE UP (ref 3.3–5)
ALP SERPL-CCNC: 133 U/L — HIGH (ref 40–120)
ALT FLD-CCNC: 53 U/L — SIGNIFICANT CHANGE UP (ref 12–78)
AMPHET UR-MCNC: NEGATIVE — SIGNIFICANT CHANGE UP
ANION GAP SERPL CALC-SCNC: 9 MMOL/L — SIGNIFICANT CHANGE UP (ref 5–17)
AST SERPL-CCNC: 50 U/L — HIGH (ref 15–37)
BARBITURATES UR SCN-MCNC: NEGATIVE — SIGNIFICANT CHANGE UP
BENZODIAZ UR-MCNC: NEGATIVE — SIGNIFICANT CHANGE UP
BILIRUB SERPL-MCNC: 1.2 MG/DL — SIGNIFICANT CHANGE UP (ref 0.2–1.2)
BUN SERPL-MCNC: 3 MG/DL — LOW (ref 7–23)
CALCIUM SERPL-MCNC: 8.9 MG/DL — SIGNIFICANT CHANGE UP (ref 8.5–10.1)
CHLORIDE SERPL-SCNC: 103 MMOL/L — SIGNIFICANT CHANGE UP (ref 96–108)
CHOLEST SERPL-MCNC: 161 MG/DL — SIGNIFICANT CHANGE UP
CO2 SERPL-SCNC: 27 MMOL/L — SIGNIFICANT CHANGE UP (ref 22–31)
COCAINE METAB.OTHER UR-MCNC: NEGATIVE — SIGNIFICANT CHANGE UP
CREAT SERPL-MCNC: 0.9 MG/DL — SIGNIFICANT CHANGE UP (ref 0.5–1.3)
EGFR: 109 ML/MIN/1.73M2 — SIGNIFICANT CHANGE UP
GLUCOSE SERPL-MCNC: 106 MG/DL — HIGH (ref 70–99)
HCT VFR BLD CALC: 46.4 % — SIGNIFICANT CHANGE UP (ref 39–50)
HDLC SERPL-MCNC: 38 MG/DL — LOW
HGB BLD-MCNC: 15.8 G/DL — SIGNIFICANT CHANGE UP (ref 13–17)
LACTATE SERPL-SCNC: 1.9 MMOL/L — SIGNIFICANT CHANGE UP (ref 0.7–2)
LIDOCAIN IGE QN: 627 U/L — HIGH (ref 73–393)
LIPID PNL WITH DIRECT LDL SERPL: 48 MG/DL — SIGNIFICANT CHANGE UP
MAGNESIUM SERPL-MCNC: 1.6 MG/DL — SIGNIFICANT CHANGE UP (ref 1.6–2.6)
MCHC RBC-ENTMCNC: 29.8 PG — SIGNIFICANT CHANGE UP (ref 27–34)
MCHC RBC-ENTMCNC: 34.1 G/DL — SIGNIFICANT CHANGE UP (ref 32–36)
MCV RBC AUTO: 87.4 FL — SIGNIFICANT CHANGE UP (ref 80–100)
METHADONE UR-MCNC: NEGATIVE — SIGNIFICANT CHANGE UP
NON HDL CHOLESTEROL: 123 MG/DL — SIGNIFICANT CHANGE UP
NRBC # BLD: 0 /100 WBCS — SIGNIFICANT CHANGE UP (ref 0–0)
OPIATES UR-MCNC: NEGATIVE — SIGNIFICANT CHANGE UP
PCP SPEC-MCNC: SIGNIFICANT CHANGE UP
PCP UR-MCNC: NEGATIVE — SIGNIFICANT CHANGE UP
PHOSPHATE SERPL-MCNC: 2.1 MG/DL — LOW (ref 2.5–4.5)
PLATELET # BLD AUTO: 262 K/UL — SIGNIFICANT CHANGE UP (ref 150–400)
POTASSIUM SERPL-MCNC: 3.1 MMOL/L — LOW (ref 3.5–5.3)
POTASSIUM SERPL-SCNC: 3.1 MMOL/L — LOW (ref 3.5–5.3)
PROT SERPL-MCNC: 7.3 GM/DL — SIGNIFICANT CHANGE UP (ref 6–8.3)
RBC # BLD: 5.31 M/UL — SIGNIFICANT CHANGE UP (ref 4.2–5.8)
RBC # FLD: 12.2 % — SIGNIFICANT CHANGE UP (ref 10.3–14.5)
SODIUM SERPL-SCNC: 139 MMOL/L — SIGNIFICANT CHANGE UP (ref 135–145)
THC UR QL: NEGATIVE — SIGNIFICANT CHANGE UP
TRIGL SERPL-MCNC: 376 MG/DL — HIGH
WBC # BLD: 7.97 K/UL — SIGNIFICANT CHANGE UP (ref 3.8–10.5)
WBC # FLD AUTO: 7.97 K/UL — SIGNIFICANT CHANGE UP (ref 3.8–10.5)

## 2022-09-29 PROCEDURE — 99233 SBSQ HOSP IP/OBS HIGH 50: CPT

## 2022-09-29 RX ORDER — POTASSIUM PHOSPHATE, MONOBASIC POTASSIUM PHOSPHATE, DIBASIC 236; 224 MG/ML; MG/ML
15 INJECTION, SOLUTION INTRAVENOUS ONCE
Refills: 0 | Status: COMPLETED | OUTPATIENT
Start: 2022-09-29 | End: 2022-09-29

## 2022-09-29 RX ORDER — POTASSIUM CHLORIDE 20 MEQ
40 PACKET (EA) ORAL EVERY 4 HOURS
Refills: 0 | Status: COMPLETED | OUTPATIENT
Start: 2022-09-29 | End: 2022-09-29

## 2022-09-29 RX ADMIN — POTASSIUM PHOSPHATE, MONOBASIC POTASSIUM PHOSPHATE, DIBASIC 62.5 MILLIMOLE(S): 236; 224 INJECTION, SOLUTION INTRAVENOUS at 11:13

## 2022-09-29 RX ADMIN — Medication 40 MILLIEQUIVALENT(S): at 16:19

## 2022-09-29 RX ADMIN — Medication 100 MILLIGRAM(S): at 14:43

## 2022-09-29 RX ADMIN — PANTOPRAZOLE SODIUM 40 MILLIGRAM(S): 20 TABLET, DELAYED RELEASE ORAL at 05:39

## 2022-09-29 RX ADMIN — Medication 1 TABLET(S): at 14:43

## 2022-09-29 RX ADMIN — Medication 1 MILLIGRAM(S): at 14:43

## 2022-09-29 RX ADMIN — PANTOPRAZOLE SODIUM 40 MILLIGRAM(S): 20 TABLET, DELAYED RELEASE ORAL at 18:09

## 2022-09-29 RX ADMIN — Medication 40 MILLIEQUIVALENT(S): at 10:25

## 2022-09-29 RX ADMIN — SODIUM CHLORIDE 125 MILLILITER(S): 9 INJECTION, SOLUTION INTRAVENOUS at 01:35

## 2022-09-29 RX ADMIN — AMLODIPINE BESYLATE 10 MILLIGRAM(S): 2.5 TABLET ORAL at 05:39

## 2022-09-29 NOTE — PATIENT PROFILE ADULT - FALL HARM RISK - UNIVERSAL INTERVENTIONS
Bed in lowest position, wheels locked, appropriate side rails in place/Call bell, personal items and telephone in reach/Instruct patient to call for assistance before getting out of bed or chair/Non-slip footwear when patient is out of bed/[place] to call system/Physically safe environment - no spills, clutter or unnecessary equipment/Purposeful Proactive Rounding/Room/bathroom lighting operational, light cord in reach

## 2022-09-29 NOTE — PROGRESS NOTE ADULT - ASSESSMENT
42 years old male with h/o HTN, alcohol use present to ED with complain of nausea, vomiting for 2 days. Patient reported multiple episode of vomiting for last 2 days. Reportedly had 2 episode of vomiting with dark red blood, associated with left upper abdominal pain. Normally drink a few times a week but for last 1 week, patient has been drinking 1L of liquor daily for last week.   Tachycardic to 140, BP stable, afebrile, sat well at RA. EKG with sinus tachy, . No leukocytosis, hb 17.5, lactate 4-->6-->3.1, K 3.3, Cr 1.21 ( 0.98 on 9/5/2022), lipase 558, serum alcohol 102. CT abd/pelvis with hepatic steatosis, distal esophageal wall thickening.       Admitted with hematemesis, intractable nausea, vomiting     Problem/Plan - 1:  ·  Problem: Hematemesis.   ·  Plan: reported 2 episode of dark blood vomiting  ? Georgina Fithc syndrome vs gastritis  now resolved   IV pantoprazole 40mg bid  H/H stable   IV fluid  GI following      Problem/Plan - 2:  ·  Problem: Intractable nausea and vomiting. resolved   ·  Plan: nausea and vomiting for 2 days  likely due to alcohol use/gastritis  Zofran prn  IV fluid.  elevated lipase ? abd exam benign and patient denies abd pain      Problem/Plan - 3:  ·  Problem: Elevated lactic acid level. most likely sec to dehydration/intravascular volume depletion from heavy ETOH use   ·  Plan: lactate 4-->6-->3.1  elevated Hb, suggesting hemoconcentration.  Appear dry, likely due to dehydration  IVF   lactate normalized      Problem/Plan - 4:  ·  Problem: LEXI (acute kidney injury).   ·  Plan: Cr 1.21 ( 0.98 on 9/5/2022)  IV fluid  Monitor renal function  Avoid nephrotoxic substances.     Problem/Plan - 5:  ·  Problem: Benign essential HTN.   ·  Plan: Monitor BP and titrate BP meds.     Problem/Plan - 6:  ETOH abuse   not in w/d   thiamine, folic acid, multivitamin  CIWA 0  monitor

## 2022-09-30 VITALS
RESPIRATION RATE: 17 BRPM | OXYGEN SATURATION: 98 % | HEART RATE: 101 BPM | DIASTOLIC BLOOD PRESSURE: 85 MMHG | TEMPERATURE: 98 F | SYSTOLIC BLOOD PRESSURE: 132 MMHG

## 2022-09-30 LAB
ALBUMIN SERPL ELPH-MCNC: 3.5 G/DL — SIGNIFICANT CHANGE UP (ref 3.3–5)
ALP SERPL-CCNC: 134 U/L — HIGH (ref 40–120)
ALT FLD-CCNC: 51 U/L — SIGNIFICANT CHANGE UP (ref 12–78)
ANION GAP SERPL CALC-SCNC: 10 MMOL/L — SIGNIFICANT CHANGE UP (ref 5–17)
AST SERPL-CCNC: 45 U/L — HIGH (ref 15–37)
BILIRUB SERPL-MCNC: 0.9 MG/DL — SIGNIFICANT CHANGE UP (ref 0.2–1.2)
BUN SERPL-MCNC: 7 MG/DL — SIGNIFICANT CHANGE UP (ref 7–23)
CALCIUM SERPL-MCNC: 9.4 MG/DL — SIGNIFICANT CHANGE UP (ref 8.5–10.1)
CHLORIDE SERPL-SCNC: 104 MMOL/L — SIGNIFICANT CHANGE UP (ref 96–108)
CO2 SERPL-SCNC: 22 MMOL/L — SIGNIFICANT CHANGE UP (ref 22–31)
CREAT SERPL-MCNC: 1.01 MG/DL — SIGNIFICANT CHANGE UP (ref 0.5–1.3)
EGFR: 95 ML/MIN/1.73M2 — SIGNIFICANT CHANGE UP
GLUCOSE SERPL-MCNC: 164 MG/DL — HIGH (ref 70–99)
HCT VFR BLD CALC: 46.7 % — SIGNIFICANT CHANGE UP (ref 39–50)
HGB BLD-MCNC: 16.5 G/DL — SIGNIFICANT CHANGE UP (ref 13–17)
LIDOCAIN IGE QN: 911 U/L — HIGH (ref 73–393)
LIDOCAIN IGE QN: 963 U/L — HIGH (ref 73–393)
MCHC RBC-ENTMCNC: 30.2 PG — SIGNIFICANT CHANGE UP (ref 27–34)
MCHC RBC-ENTMCNC: 35.3 G/DL — SIGNIFICANT CHANGE UP (ref 32–36)
MCV RBC AUTO: 85.5 FL — SIGNIFICANT CHANGE UP (ref 80–100)
NRBC # BLD: 0 /100 WBCS — SIGNIFICANT CHANGE UP (ref 0–0)
PLATELET # BLD AUTO: 252 K/UL — SIGNIFICANT CHANGE UP (ref 150–400)
POTASSIUM SERPL-MCNC: 3.9 MMOL/L — SIGNIFICANT CHANGE UP (ref 3.5–5.3)
POTASSIUM SERPL-SCNC: 3.9 MMOL/L — SIGNIFICANT CHANGE UP (ref 3.5–5.3)
PROT SERPL-MCNC: 7.6 GM/DL — SIGNIFICANT CHANGE UP (ref 6–8.3)
RBC # BLD: 5.46 M/UL — SIGNIFICANT CHANGE UP (ref 4.2–5.8)
RBC # FLD: 12.3 % — SIGNIFICANT CHANGE UP (ref 10.3–14.5)
SODIUM SERPL-SCNC: 136 MMOL/L — SIGNIFICANT CHANGE UP (ref 135–145)
WBC # BLD: 9.09 K/UL — SIGNIFICANT CHANGE UP (ref 3.8–10.5)
WBC # FLD AUTO: 9.09 K/UL — SIGNIFICANT CHANGE UP (ref 3.8–10.5)

## 2022-09-30 PROCEDURE — 99239 HOSP IP/OBS DSCHRG MGMT >30: CPT

## 2022-09-30 RX ORDER — ATORVASTATIN CALCIUM 80 MG/1
40 TABLET, FILM COATED ORAL AT BEDTIME
Refills: 0 | Status: DISCONTINUED | OUTPATIENT
Start: 2022-09-30 | End: 2022-09-30

## 2022-09-30 RX ORDER — ASPIRIN/CALCIUM CARB/MAGNESIUM 324 MG
1 TABLET ORAL
Qty: 0 | Refills: 0 | DISCHARGE

## 2022-09-30 RX ORDER — PANTOPRAZOLE SODIUM 20 MG/1
1 TABLET, DELAYED RELEASE ORAL
Qty: 60 | Refills: 0
Start: 2022-09-30 | End: 2022-10-29

## 2022-09-30 RX ORDER — ATORVASTATIN CALCIUM 80 MG/1
1 TABLET, FILM COATED ORAL
Qty: 30 | Refills: 0
Start: 2022-09-30 | End: 2022-10-29

## 2022-09-30 RX ORDER — THIAMINE MONONITRATE (VIT B1) 100 MG
1 TABLET ORAL
Qty: 30 | Refills: 0
Start: 2022-09-30 | End: 2022-10-29

## 2022-09-30 RX ORDER — FOLIC ACID 0.8 MG
1 TABLET ORAL
Qty: 30 | Refills: 0
Start: 2022-09-30 | End: 2022-10-29

## 2022-09-30 RX ORDER — METOPROLOL TARTRATE 50 MG
1 TABLET ORAL
Qty: 0 | Refills: 0 | DISCHARGE
Start: 2022-09-30

## 2022-09-30 RX ORDER — METOPROLOL TARTRATE 50 MG
50 TABLET ORAL DAILY
Refills: 0 | Status: DISCONTINUED | OUTPATIENT
Start: 2022-09-30 | End: 2022-09-30

## 2022-09-30 RX ADMIN — PANTOPRAZOLE SODIUM 40 MILLIGRAM(S): 20 TABLET, DELAYED RELEASE ORAL at 06:11

## 2022-09-30 RX ADMIN — PANTOPRAZOLE SODIUM 40 MILLIGRAM(S): 20 TABLET, DELAYED RELEASE ORAL at 17:31

## 2022-09-30 RX ADMIN — Medication 1 MILLIGRAM(S): at 11:04

## 2022-09-30 RX ADMIN — Medication 100 MILLIGRAM(S): at 11:04

## 2022-09-30 RX ADMIN — Medication 50 MILLIGRAM(S): at 11:13

## 2022-09-30 RX ADMIN — Medication 1 TABLET(S): at 11:04

## 2022-09-30 RX ADMIN — AMLODIPINE BESYLATE 10 MILLIGRAM(S): 2.5 TABLET ORAL at 06:11

## 2022-09-30 RX ADMIN — SODIUM CHLORIDE 75 MILLILITER(S): 9 INJECTION, SOLUTION INTRAVENOUS at 06:10

## 2022-09-30 NOTE — DISCHARGE NOTE PROVIDER - PROVIDER TOKENS
PROVIDER:[TOKEN:[[id number]],FOLLOWUP:[1 week]],FREE:[LAST:[your primary care doctor],PHONE:[(   )    -],FAX:[(   )    -],ADDRESS:[Lit Stern MD  Internist in [place], [place]  Address: [facility] [address], [place] [address]  Phone: [phone]],FOLLOWUP:[1 week]]

## 2022-09-30 NOTE — DISCHARGE NOTE NURSING/CASE MANAGEMENT/SOCIAL WORK - NSDCPEFALRISK_GEN_ALL_CORE
For information on Fall & Injury Prevention, visit: [email or web address] OR  [email or web address] OR  https://www.cdc.gov/steadi/patient.html

## 2022-09-30 NOTE — DISCHARGE NOTE PROVIDER - NSDCCPCAREPLAN_GEN_ALL_CORE_FT
PRINCIPAL DISCHARGE DIAGNOSIS  Diagnosis: Hematemesis  Assessment and Plan of Treatment:       SECONDARY DISCHARGE DIAGNOSES  Diagnosis: Intractable nausea and vomiting  Assessment and Plan of Treatment:     Diagnosis: Benign essential HTN  Assessment and Plan of Treatment:     Diagnosis: Alcohol dependence  Assessment and Plan of Treatment:     Diagnosis: Hypertriglyceridemia  Assessment and Plan of Treatment:

## 2022-09-30 NOTE — DISCHARGE NOTE PROVIDER - CARE PROVIDER_API CALL
Uzair Young)  Internal Medicine  [address] [facility] [address], [address]  [place] [address]  Phone: [phone]  Fax: [phone]  Follow Up Time: 1 week    your primary care doctor,   Lit Stern MD  Internist in [place], [place]  Address: [facility] [address], [place] [address]  Phone: [phone]  Phone: (   )    -  Fax: (   )    -  Follow Up Time: 1 week

## 2022-09-30 NOTE — DISCHARGE NOTE PROVIDER - NSDCFUADDAPPT_GEN_ALL_CORE_FT
It is important to see your primary physician as well as other necessary consultants within the next week to perform a comprehensive medical review.  Call their offices for an appointment as soon as you leave the hospital.  If you do not have a primary physician or cant reach him/her, contact the [facility] Physician Referral Service at [phone].  Your medical issues appear to be stable at this time, but if your symptoms recur or worsen, contact your physicians and/or return to the hospital if necessary.  If you encounter any issues or questions with your medication, call your physicians before stopping the medication.

## 2022-09-30 NOTE — DISCHARGE NOTE PROVIDER - HOSPITAL COURSE
42 years old male with h/o HTN, alcohol use present to ED with complain of nausea, vomiting for 2 days. Patient reported multiple episode of vomiting for last 2 days. Reportedly had 2 episode of vomiting with dark red blood, associated with left upper abdominal pain. Normally drink a few times a week but for last 1 week, patient has been drinking 1L of liquor daily for last week.   Tachycardic to 140, BP stable, afebrile, sat well at RA. EKG with sinus tachy, . No leukocytosis, hb 17.5, lactate 4-->6-->3.1, K 3.3, Cr 1.21 ( 0.98 on 9/5/2022), lipase 558, serum alcohol 102. CT abd/pelvis with hepatic steatosis, distal esophageal wall thickening.   Admitted with hematemesis, intractable nausea, vomiting    Vital Signs Last 24 Hrs  T(C): 36.9 (30 Sep 2022 17:20), Max: 36.9 (30 Sep 2022 17:20)  T(F): 98.5 (30 Sep 2022 17:20), Max: 98.5 (30 Sep 2022 17:20)  HR: 101 (30 Sep 2022 17:20) (98 - 131)  BP: 132/85 (30 Sep 2022 17:20) (129/87 - 136/95)  BP(mean): --  RR: 17 (30 Sep 2022 17:20) (16 - 18)  SpO2: 98% (30 Sep 2022 17:20) (96% - 98%)    Parameters below as of 30 Sep 2022 17:20  Patient On (Oxygen Delivery Method): room air         Problem/Plan - 1:  ·  Problem: Hematemesis.   ·  Plan: reported 2 episode of dark blood vomiting  ? Georgina Fitch syndrome vs gastritis  now resolved     pantoprazole 40mg bid  H/H stable    outpatient GI follow-up      Problem/Plan - 2:  ·  Problem: Intractable nausea and vomiting. resolved   ·  Plan: nausea and vomiting for 2 days  likely due to alcohol use/gastritis  elevated lipase ? abd exam benign and patient denies abd pain      Problem/Plan - 3:  ·  Problem: Elevated lactic acid level. most likely sec to dehydration/intravascular volume depletion from heavy ETOH use   ·  Plan: lactate 4-->6-->3.1  elevated Hb, suggesting hemoconcentration.  Appear dry, likely due to dehydration  IVF   lactate normalized          Problem/Plan - 5:  ·  Problem: Benign essential HTN.   ·  Plan: Monitor BP and titrate BP meds.     Problem/Plan - 6:  ETOH abuse   not in w/d   thiamine, folic acid, multivitamin  CIWA 0    Discharge time : 40 min   RETURN PARAMETERS DISCUSSED WITH PATIENT, PATIENT EXPRESSED UNDERSTANDING AND IS AGREEABLE. DISCUSSED WITH PATIENT ON REFRAINING FROM DRIVING UNTIL FOLLOW-UP/ CLEARED BY PMD. PATIENT EXPRESSED UNDERSTANDING.   42 years old male with h/o HTN, alcohol use present to ED with complain of nausea, vomiting for 2 days. Patient reported multiple episode of vomiting for last 2 days. Reportedly had 2 episode of vomiting with dark red blood, associated with left upper abdominal pain. Normally drink a few times a week but for last 1 week, patient has been drinking 1L of liquor daily for last week.   Tachycardic to 140, BP stable, afebrile, sat well at RA. EKG with sinus tachy, . No leukocytosis, hb 17.5, lactate 4-->6-->3.1, K 3.3, Cr 1.21 ( 0.98 on 9/5/2022), lipase 558, serum alcohol 102. CT abd/pelvis with hepatic steatosis, distal esophageal wall thickening.   Admitted with hematemesis, intractable nausea, vomiting    Vital Signs Last 24 Hrs  T(C): 36.9 (30 Sep 2022 17:20), Max: 36.9 (30 Sep 2022 17:20)  T(F): 98.5 (30 Sep 2022 17:20), Max: 98.5 (30 Sep 2022 17:20)  HR: 101 (30 Sep 2022 17:20) (98 - 131)  BP: 132/85 (30 Sep 2022 17:20) (129/87 - 136/95)  BP(mean): --  RR: 17 (30 Sep 2022 17:20) (16 - 18)  SpO2: 98% (30 Sep 2022 17:20) (96% - 98%)    Parameters below as of 30 Sep 2022 17:20  Patient On (Oxygen Delivery Method): room air         Problem/Plan - 1:  ·  Problem: Hematemesis.   ·  Plan: reported 2 episode of dark blood vomiting  ? Georgina Fitch syndrome vs gastritis  now resolved     pantoprazole 40mg bid  H/H stable    outpatient GI follow-up      Problem/Plan - 2:  ·  Problem: Intractable nausea and vomiting. resolved   ·  Plan: nausea and vomiting for 2 days  likely due to alcohol use/gastritis  elevated lipase ? abd exam benign and patient denies abd pain , patient requesting to be discharged, would like to follow-up outpatient   patient tolerating regular diet without issues      Problem/Plan - 3:  ·  Problem: Elevated lactic acid level. most likely sec to dehydration/intravascular volume depletion from heavy ETOH use   ·  Plan: lactate 4-->6-->3.1  elevated Hb, suggesting hemoconcentration.  Appear dry, likely due to dehydration  IVF   lactate normalized          Problem/Plan - 5:  ·  Problem: Benign essential HTN.   ·  Plan: Monitor BP and titrate BP meds.     Problem/Plan - 6:  ETOH abuse   not in w/d   thiamine, folic acid, multivitamin  CIWA 0    Discharge time : 40 min   RETURN PARAMETERS DISCUSSED WITH PATIENT, PATIENT EXPRESSED UNDERSTANDING AND IS AGREEABLE. DISCUSSED WITH PATIENT ON REFRAINING FROM DRIVING UNTIL FOLLOW-UP/ CLEARED BY PMD. PATIENT EXPRESSED UNDERSTANDING.   42 years old male with h/o HTN, alcohol use present to ED with complain of nausea, vomiting for 2 days. Patient reported multiple episode of vomiting for last 2 days. Reportedly had 2 episode of vomiting with dark red blood, associated with left upper abdominal pain. Normally drink a few times a week but for last 1 week, patient has been drinking 1L of liquor daily for last week.   Tachycardic to 140, BP stable, afebrile, sat well at RA. EKG with sinus tachy, . No leukocytosis, hb 17.5, lactate 4-->6-->3.1, K 3.3, Cr 1.21 ( 0.98 on 9/5/2022), lipase 558, serum alcohol 102. CT abd/pelvis with hepatic steatosis, distal esophageal wall thickening.   Admitted with hematemesis, intractable nausea, vomiting    Vital Signs Last 24 Hrs  T(C): 36.9 (30 Sep 2022 17:20), Max: 36.9 (30 Sep 2022 17:20)  T(F): 98.5 (30 Sep 2022 17:20), Max: 98.5 (30 Sep 2022 17:20)  HR: 101 (30 Sep 2022 17:20) (98 - 131)  BP: 132/85 (30 Sep 2022 17:20) (129/87 - 136/95)  BP(mean): --  RR: 17 (30 Sep 2022 17:20) (16 - 18)  SpO2: 98% (30 Sep 2022 17:20) (96% - 98%)    Parameters below as of 30 Sep 2022 17:20  Patient On (Oxygen Delivery Method): room air    GENERAL: NAD,no increased WOB  HEAD:  Atraumatic, Normocephalic  EYES: EOMI, PERRLA, conjunctiva and sclera clear  ENMT: Moist mucous membranes  NECK: Supple, No JVD  NERVOUS SYSTEM:  Alert & Oriented X3, no focal neuro deficits   CHEST/LUNG: Clear to auscultation bilaterally; No rales, rhonchi, wheezing, or rubs  HEART: Regular rate and rhythm; No murmurs, rubs, or gallops  ABDOMEN: Soft, Nontender, Nondistended; Bowel sounds present  EXTREMITIES:  2+ Peripheral Pulses b/l, No clubbing, cyanosis, calf tenderness or edema b/l    Hematemesis.   ·  Plan: reported 2 episode of dark blood vomiting  ? Georgina Fitch syndrome vs gastritis  now resolved     pantoprazole 40mg bid  H/H stable    outpatient GI follow-up      Intractable nausea and vomiting. resolved   ·  Plan: nausea and vomiting for 2 days  likely due to alcohol use/gastritis/esophagitis   utox neg   elevated lipase ? abd exam benign and patient denies abd pain , patient requesting to be discharged, would like to follow-up outpatient   patient tolerating regular diet without issues      Elevated lactic acid level. most likely sec to dehydration/intravascular volume  depletion , heavy ETOH use   elevated Hb, suggesting hemoconcentration.  Appear dry, likely due to dehydration  IVF   lactate normalized      Benign essential HTN.   continue with home meds     ETOH abuse   hepatic steatosis   not in w/d   thiamine, folic acid, multivitamin  CIWA 0  education on ETOH cessation provided     Discharge time : 40 min   RETURN PARAMETERS DISCUSSED WITH PATIENT, PATIENT EXPRESSED UNDERSTANDING AND IS AGREEABLE. DISCUSSED WITH PATIENT ON REFRAINING FROM DRIVING UNTIL FOLLOW-UP/ CLEARED BY PMD. PATIENT EXPRESSED UNDERSTANDING.

## 2022-09-30 NOTE — PROGRESS NOTE ADULT - REASON FOR ADMISSION
hematemesis, intractable nausea and vomiting

## 2022-09-30 NOTE — DISCHARGE NOTE NURSING/CASE MANAGEMENT/SOCIAL WORK - PATIENT PORTAL LINK FT
You can access the FollowMyHealth Patient Portal offered by [facility] by registering at the following website: [email or web address]. By joining [facility]’s FollowMyHealth portal, you will also be able to view your health information using other applications (apps) compatible with our system.

## 2022-09-30 NOTE — PROGRESS NOTE ADULT - SUBJECTIVE AND OBJECTIVE BOX
Pt feeling better - but lipase is rising  Pt wants to go home - no abd pain    MEDICATIONS  (STANDING):  atorvastatin 40 milliGRAM(s) Oral at bedtime  folic acid 1 milliGRAM(s) Oral daily  lactated ringers. 1000 milliLiter(s) (75 mL/Hr) IV Continuous <Continuous>  metoprolol succinate ER 50 milliGRAM(s) Oral daily  multivitamin 1 Tablet(s) Oral daily  pantoprazole  Injectable 40 milliGRAM(s) IV Push every 12 hours  thiamine 100 milliGRAM(s) Oral daily    MEDICATIONS  (PRN):  acetaminophen     Tablet .. 650 milliGRAM(s) Oral every 6 hours PRN Mild Pain (1 - 3), Moderate Pain (4 - 6)  aluminum hydroxide/magnesium hydroxide/simethicone Suspension 30 milliLiter(s) Oral every 4 hours PRN Dyspepsia  melatonin 3 milliGRAM(s) Oral at bedtime PRN Insomnia  ondansetron Injectable 4 milliGRAM(s) IV Push every 6 hours PRN Nausea and/or Vomiting      Allergies    No Known Allergies    Intolerances        Vital Signs Last 24 Hrs  T(C): 36.8 (30 Sep 2022 10:31), Max: 36.8 (29 Sep 2022 23:52)  T(F): 98.3 (30 Sep 2022 10:31), Max: 98.3 (30 Sep 2022 10:31)  HR: 131 (30 Sep 2022 10:31) (98 - 131)  BP: 132/92 (30 Sep 2022 10:31) (129/87 - 136/96)  BP(mean): --  RR: 18 (30 Sep 2022 10:31) (16 - 18)  SpO2: 98% (30 Sep 2022 10:31) (96% - 98%)    Parameters below as of 30 Sep 2022 10:31  Patient On (Oxygen Delivery Method): room air        PHYSICAL EXAM:  General: NAD.  CVS: S1, S2  Chest: air entry bilaterally present  Abd: BS present, soft, non-tender      LABS:                        16.5   9.09  )-----------( 252      ( 30 Sep 2022 09:50 )             46.7     09-30    136  |  104  |  7   ----------------------------<  164<H>  3.9   |  22  |  1.01    Ca    9.4      30 Sep 2022 09:50  Phos  2.1     09-29  Mg     1.6     09-29    TPro  7.6  /  Alb  3.5  /  TBili  0.9  /  DBili  x   /  AST  45<H>  /  ALT  51  /  AlkPhos  134<H>  09-30        If pt insists upon discharge - would suggest clear liquids x 2 days and repeat lipase as outpatient this week      
Pt feeling much better  denies abd pain  started on diet    Lipase, Serum: 627 U/L (09.29.22 @ 06:47)          MEDICATIONS  (STANDING):  amLODIPine   Tablet 10 milliGRAM(s) Oral daily  folic acid 1 milliGRAM(s) Oral daily  lactated ringers. 1000 milliLiter(s) (75 mL/Hr) IV Continuous <Continuous>  multivitamin 1 Tablet(s) Oral daily  pantoprazole  Injectable 40 milliGRAM(s) IV Push every 12 hours  potassium chloride   Powder 40 milliEquivalent(s) Oral every 4 hours  thiamine 100 milliGRAM(s) Oral daily    MEDICATIONS  (PRN):  acetaminophen     Tablet .. 650 milliGRAM(s) Oral every 6 hours PRN Mild Pain (1 - 3), Moderate Pain (4 - 6)  aluminum hydroxide/magnesium hydroxide/simethicone Suspension 30 milliLiter(s) Oral every 4 hours PRN Dyspepsia  chlordiazePOXIDE 25 milliGRAM(s) Oral every 6 hours PRN Anxiety and/or Alcohol Withdrawal Symptoms  melatonin 3 milliGRAM(s) Oral at bedtime PRN Insomnia  ondansetron Injectable 4 milliGRAM(s) IV Push every 6 hours PRN Nausea and/or Vomiting      Allergies    No Known Allergies    Intolerances        Vital Signs Last 24 Hrs  T(C): 36.7 (29 Sep 2022 10:52), Max: 37.1 (28 Sep 2022 15:51)  T(F): 98.1 (29 Sep 2022 10:52), Max: 98.8 (28 Sep 2022 15:51)  HR: 124 (29 Sep 2022 10:52) (95 - 124)  BP: 145/99 (29 Sep 2022 10:52) (129/89 - 145/99)  BP(mean): --  RR: 17 (29 Sep 2022 10:52) (16 - 25)  SpO2: 98% (29 Sep 2022 10:52) (96% - 99%)    Parameters below as of 29 Sep 2022 10:52  Patient On (Oxygen Delivery Method): room air        PHYSICAL EXAM:  General: NAD.  CVS: S1, S2  Chest: air entry bilaterally present  Abd: BS present, soft, non-tender      LABS:                        15.8   7.97  )-----------( 262      ( 29 Sep 2022 06:47 )             46.4     09-29    139  |  103  |  3<L>  ----------------------------<  106<H>  3.1<L>   |  27  |  0.90    Ca    8.9      29 Sep 2022 06:47  Phos  2.1     09-29  Mg     1.6     09-29    TPro  7.3  /  Alb  3.4  /  TBili  1.2  /  DBili  x   /  AST  50<H>  /  ALT  53  /  AlkPhos  133<H>  09-29    PT/INR - ( 28 Sep 2022 09:15 )   PT: 12.2 sec;   INR: 1.02 ratio           concerned about rising lipase although pt denies abd pain  repeat Lipase in AM      
PROGRESS NOTE:     Patient is a 42y old  Male who presents with a chief complaint of hematemesis, intractable nausea and vomiting (29 Sep 2022 15:18)        SUBJECTIVE & OBJECTIVE:   Pt seen and examined at bedside in AM    no overnight events.   denies N/V or hematemesis today , no melena or brbpr   no tremors or dizziness   denies abd pain or diarrhea       REVIEW OF SYSTEMS: remaining ROS negative     PHYSICAL EXAM:  Vital Signs Last 24 Hrs  T(C): 36.7 (29 Sep 2022 10:52), Max: 37.1 (28 Sep 2022 15:51)  T(F): 98.1 (29 Sep 2022 10:52), Max: 98.8 (28 Sep 2022 15:51)  HR: 124 (29 Sep 2022 10:52) (95 - 124)  BP: 145/99 (29 Sep 2022 10:52) (129/89 - 145/99)  BP(mean): --  RR: 17 (29 Sep 2022 10:52) (16 - 25)  SpO2: 98% (29 Sep 2022 10:52) (96% - 99%)    Parameters below as of 29 Sep 2022 10:52  Patient On (Oxygen Delivery Method): room air          GENERAL: NAD,no increased WOB  HEAD:  Atraumatic, Normocephalic  EYES: EOMI, PERRLA, conjunctiva and sclera clear  ENMT: Moist mucous membranes  NECK: Supple, No JVD  NERVOUS SYSTEM:  Alert & Oriented X3, no focal neuro deficits   CHEST/LUNG: Clear to auscultation bilaterally; No rales, rhonchi, wheezing, or rubs  HEART: Regular rate and rhythm; No murmurs, rubs, or gallops  ABDOMEN: Soft, Nontender, Nondistended; Bowel sounds present  EXTREMITIES:  2+ Peripheral Pulses b/l, No clubbing, cyanosis, calf tenderness or edema b/l    MEDICATIONS  (STANDING):  amLODIPine   Tablet 10 milliGRAM(s) Oral daily  atorvastatin 40 milliGRAM(s) Oral at bedtime  folic acid 1 milliGRAM(s) Oral daily  lactated ringers. 1000 milliLiter(s) (75 mL/Hr) IV Continuous <Continuous>  multivitamin 1 Tablet(s) Oral daily  pantoprazole  Injectable 40 milliGRAM(s) IV Push every 12 hours  thiamine 100 milliGRAM(s) Oral daily    MEDICATIONS  (PRN):  acetaminophen     Tablet .. 650 milliGRAM(s) Oral every 6 hours PRN Mild Pain (1 - 3), Moderate Pain (4 - 6)  aluminum hydroxide/magnesium hydroxide/simethicone Suspension 30 milliLiter(s) Oral every 4 hours PRN Dyspepsia  melatonin 3 milliGRAM(s) Oral at bedtime PRN Insomnia  ondansetron Injectable 4 milliGRAM(s) IV Push every 6 hours PRN Nausea and/or Vomiting      LABS:                        15.8   7.97  )-----------( 262      ( 29 Sep 2022 06:47 )             46.4     09-29    139  |  103  |  3<L>  ----------------------------<  106<H>  3.1<L>   |  27  |  0.90    Ca    8.9      29 Sep 2022 06:47  Phos  2.1     09-29  Mg     1.6     09-29    TPro  7.3  /  Alb  3.4  /  TBili  1.2  /  DBili  x   /  AST  50<H>  /  ALT  53  /  AlkPhos  133<H>  09-29    PT/INR - ( 28 Sep 2022 09:15 )   PT: 12.2 sec;   INR: 1.02 ratio               CAPILLARY BLOOD GLUCOSE                RECENT CULTURES:          RADIOLOGY & ADDITIONAL TESTS:          Imaging Personally Reviewed:  [ ] YES  [ ] NO    Consultant(s) Notes Reviewed:  [x ] YES  [ ] NO    Care Discussed with Consultants/Other Providers [x ] YES  [ ] NO  Care plan and all findings were discussed in detail with patient.  All questions and concerns addressed

## 2022-09-30 NOTE — DISCHARGE NOTE PROVIDER - NSDCMRMEDTOKEN_GEN_ALL_CORE_FT
amLODIPine 10 mg oral tablet: 1 tab(s) orally once a day  atorvastatin 40 mg oral tablet: 1 tab(s) orally once a day (at bedtime)  folic acid 1 mg oral tablet: 1 tab(s) orally once a day  metoprolol succinate 50 mg oral tablet, extended release: 1 tab(s) orally once a day  Multiple Vitamins oral tablet: 1 tab(s) orally once a day  pantoprazole 40 mg oral delayed release tablet: 1 tab(s) orally 2 times a day   thiamine 100 mg oral tablet: 1 tab(s) orally once a day

## 2022-10-13 DIAGNOSIS — Z71.41 ALCOHOL ABUSE COUNSELING AND SURVEILLANCE OF ALCOHOLIC: ICD-10-CM

## 2022-10-13 DIAGNOSIS — K76.0 FATTY (CHANGE OF) LIVER, NOT ELSEWHERE CLASSIFIED: ICD-10-CM

## 2022-10-13 DIAGNOSIS — F10.20 ALCOHOL DEPENDENCE, UNCOMPLICATED: ICD-10-CM

## 2022-10-13 DIAGNOSIS — K29.21 ALCOHOLIC GASTRITIS WITH BLEEDING: ICD-10-CM

## 2022-10-13 DIAGNOSIS — E78.1 PURE HYPERGLYCERIDEMIA: ICD-10-CM

## 2022-10-13 DIAGNOSIS — Z79.82 LONG TERM (CURRENT) USE OF ASPIRIN: ICD-10-CM

## 2022-10-13 DIAGNOSIS — N17.9 ACUTE KIDNEY FAILURE, UNSPECIFIED: ICD-10-CM
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
## 2022-10-13 DIAGNOSIS — E86.1 HYPOVOLEMIA: ICD-10-CM

## 2022-10-13 DIAGNOSIS — K20.91 ESOPHAGITIS, UNSPECIFIED WITH BLEEDING: ICD-10-CM

## 2022-10-13 DIAGNOSIS — E86.0 DEHYDRATION: ICD-10-CM

## 2022-11-15 ENCOUNTER — INPATIENT (INPATIENT)
Facility: HOSPITAL | Age: 42
LOS: 3 days | Discharge: HOME | End: 2022-11-19
Attending: STUDENT IN AN ORGANIZED HEALTH CARE EDUCATION/TRAINING PROGRAM | Admitting: STUDENT IN AN ORGANIZED HEALTH CARE EDUCATION/TRAINING PROGRAM

## 2022-11-15 VITALS
RESPIRATION RATE: 22 BRPM | DIASTOLIC BLOOD PRESSURE: 122 MMHG | OXYGEN SATURATION: 97 % | SYSTOLIC BLOOD PRESSURE: 166 MMHG | WEIGHT: 175.05 LBS | HEIGHT: 67 IN | HEART RATE: 133 BPM | TEMPERATURE: 99 F

## 2022-11-15 LAB
ALBUMIN SERPL ELPH-MCNC: 3.9 G/DL — SIGNIFICANT CHANGE UP (ref 3.3–5)
ALP SERPL-CCNC: 119 U/L — SIGNIFICANT CHANGE UP (ref 40–120)
ALT FLD-CCNC: 52 U/L — SIGNIFICANT CHANGE UP (ref 12–78)
ANION GAP SERPL CALC-SCNC: 11 MMOL/L — SIGNIFICANT CHANGE UP (ref 5–17)
AST SERPL-CCNC: 55 U/L — HIGH (ref 15–37)
BASOPHILS # BLD AUTO: 0.09 K/UL — SIGNIFICANT CHANGE UP (ref 0–0.2)
BASOPHILS NFR BLD AUTO: 0.9 % — SIGNIFICANT CHANGE UP (ref 0–2)
BILIRUB SERPL-MCNC: 0.6 MG/DL — SIGNIFICANT CHANGE UP (ref 0.2–1.2)
BUN SERPL-MCNC: 11 MG/DL — SIGNIFICANT CHANGE UP (ref 7–23)
CALCIUM SERPL-MCNC: 9.1 MG/DL — SIGNIFICANT CHANGE UP (ref 8.5–10.1)
CHLORIDE SERPL-SCNC: 102 MMOL/L — SIGNIFICANT CHANGE UP (ref 96–108)
CO2 SERPL-SCNC: 24 MMOL/L — SIGNIFICANT CHANGE UP (ref 22–31)
CREAT SERPL-MCNC: 1.06 MG/DL — SIGNIFICANT CHANGE UP (ref 0.5–1.3)
EGFR: 89 ML/MIN/1.73M2 — SIGNIFICANT CHANGE UP
EOSINOPHIL # BLD AUTO: 0.03 K/UL — SIGNIFICANT CHANGE UP (ref 0–0.5)
EOSINOPHIL NFR BLD AUTO: 0.3 % — SIGNIFICANT CHANGE UP (ref 0–6)
ETHANOL SERPL-MCNC: 358 MG/DL — HIGH (ref 0–10)
FLUAV AG NPH QL: SIGNIFICANT CHANGE UP
FLUBV AG NPH QL: SIGNIFICANT CHANGE UP
GLUCOSE SERPL-MCNC: 139 MG/DL — HIGH (ref 70–99)
HCT VFR BLD CALC: 45.7 % — SIGNIFICANT CHANGE UP (ref 39–50)
HGB BLD-MCNC: 16.7 G/DL — SIGNIFICANT CHANGE UP (ref 13–17)
IMM GRANULOCYTES NFR BLD AUTO: 0.4 % — SIGNIFICANT CHANGE UP (ref 0–0.9)
LACTATE SERPL-SCNC: 2.4 MMOL/L — HIGH (ref 0.7–2)
LACTATE SERPL-SCNC: 2.9 MMOL/L — HIGH (ref 0.7–2)
LIDOCAIN IGE QN: 1583 U/L — HIGH (ref 73–393)
LYMPHOCYTES # BLD AUTO: 3.7 K/UL — HIGH (ref 1–3.3)
LYMPHOCYTES # BLD AUTO: 38.7 % — SIGNIFICANT CHANGE UP (ref 13–44)
MAGNESIUM SERPL-MCNC: 2.4 MG/DL — SIGNIFICANT CHANGE UP (ref 1.6–2.6)
MCHC RBC-ENTMCNC: 29.4 PG — SIGNIFICANT CHANGE UP (ref 27–34)
MCHC RBC-ENTMCNC: 36.5 G/DL — HIGH (ref 32–36)
MCV RBC AUTO: 80.5 FL — SIGNIFICANT CHANGE UP (ref 80–100)
MONOCYTES # BLD AUTO: 0.52 K/UL — SIGNIFICANT CHANGE UP (ref 0–0.9)
MONOCYTES NFR BLD AUTO: 5.4 % — SIGNIFICANT CHANGE UP (ref 2–14)
NEUTROPHILS # BLD AUTO: 5.18 K/UL — SIGNIFICANT CHANGE UP (ref 1.8–7.4)
NEUTROPHILS NFR BLD AUTO: 54.3 % — SIGNIFICANT CHANGE UP (ref 43–77)
NRBC # BLD: 0 /100 WBCS — SIGNIFICANT CHANGE UP (ref 0–0)
PLATELET # BLD AUTO: 314 K/UL — SIGNIFICANT CHANGE UP (ref 150–400)
POTASSIUM SERPL-MCNC: 3.4 MMOL/L — LOW (ref 3.5–5.3)
POTASSIUM SERPL-SCNC: 3.4 MMOL/L — LOW (ref 3.5–5.3)
PROT SERPL-MCNC: 8.2 GM/DL — SIGNIFICANT CHANGE UP (ref 6–8.3)
RBC # BLD: 5.68 M/UL — SIGNIFICANT CHANGE UP (ref 4.2–5.8)
RBC # FLD: 11.6 % — SIGNIFICANT CHANGE UP (ref 10.3–14.5)
SARS-COV-2 RNA SPEC QL NAA+PROBE: SIGNIFICANT CHANGE UP
SODIUM SERPL-SCNC: 137 MMOL/L — SIGNIFICANT CHANGE UP (ref 135–145)
TROPONIN I, HIGH SENSITIVITY RESULT: 8.1 NG/L — SIGNIFICANT CHANGE UP
WBC # BLD: 9.56 K/UL — SIGNIFICANT CHANGE UP (ref 3.8–10.5)
WBC # FLD AUTO: 9.56 K/UL — SIGNIFICANT CHANGE UP (ref 3.8–10.5)

## 2022-11-15 PROCEDURE — 74177 CT ABD & PELVIS W/CONTRAST: CPT | Mod: 26,MA

## 2022-11-15 PROCEDURE — 99285 EMERGENCY DEPT VISIT HI MDM: CPT

## 2022-11-15 PROCEDURE — 99223 1ST HOSP IP/OBS HIGH 75: CPT

## 2022-11-15 PROCEDURE — 93010 ELECTROCARDIOGRAM REPORT: CPT

## 2022-11-15 PROCEDURE — 70450 CT HEAD/BRAIN W/O DYE: CPT | Mod: 26,MA

## 2022-11-15 PROCEDURE — 71045 X-RAY EXAM CHEST 1 VIEW: CPT | Mod: 26

## 2022-11-15 RX ORDER — LANOLIN ALCOHOL/MO/W.PET/CERES
3 CREAM (GRAM) TOPICAL AT BEDTIME
Refills: 0 | Status: DISCONTINUED | OUTPATIENT
Start: 2022-11-15 | End: 2022-11-19

## 2022-11-15 RX ORDER — FOLIC ACID 0.8 MG
1 TABLET ORAL ONCE
Refills: 0 | Status: COMPLETED | OUTPATIENT
Start: 2022-11-15 | End: 2022-11-15

## 2022-11-15 RX ORDER — SODIUM CHLORIDE 9 MG/ML
1000 INJECTION, SOLUTION INTRAVENOUS
Refills: 0 | Status: DISCONTINUED | OUTPATIENT
Start: 2022-11-15 | End: 2022-11-18

## 2022-11-15 RX ORDER — ONDANSETRON 8 MG/1
4 TABLET, FILM COATED ORAL EVERY 8 HOURS
Refills: 0 | Status: DISCONTINUED | OUTPATIENT
Start: 2022-11-15 | End: 2022-11-19

## 2022-11-15 RX ORDER — PANTOPRAZOLE SODIUM 20 MG/1
40 TABLET, DELAYED RELEASE ORAL
Refills: 0 | Status: DISCONTINUED | OUTPATIENT
Start: 2022-11-15 | End: 2022-11-19

## 2022-11-15 RX ORDER — THIAMINE MONONITRATE (VIT B1) 100 MG
100 TABLET ORAL DAILY
Refills: 0 | Status: DISCONTINUED | OUTPATIENT
Start: 2022-11-15 | End: 2022-11-19

## 2022-11-15 RX ORDER — MORPHINE SULFATE 50 MG/1
1 CAPSULE, EXTENDED RELEASE ORAL EVERY 4 HOURS
Refills: 0 | Status: DISCONTINUED | OUTPATIENT
Start: 2022-11-15 | End: 2022-11-19

## 2022-11-15 RX ORDER — ONDANSETRON 8 MG/1
4 TABLET, FILM COATED ORAL ONCE
Refills: 0 | Status: COMPLETED | OUTPATIENT
Start: 2022-11-15 | End: 2022-11-15

## 2022-11-15 RX ORDER — FOLIC ACID 0.8 MG
1 TABLET ORAL DAILY
Refills: 0 | Status: DISCONTINUED | OUTPATIENT
Start: 2022-11-15 | End: 2022-11-19

## 2022-11-15 RX ORDER — SUCRALFATE 1 G
1 TABLET ORAL ONCE
Refills: 0 | Status: COMPLETED | OUTPATIENT
Start: 2022-11-15 | End: 2022-11-15

## 2022-11-15 RX ORDER — FAMOTIDINE 10 MG/ML
20 INJECTION INTRAVENOUS ONCE
Refills: 0 | Status: COMPLETED | OUTPATIENT
Start: 2022-11-15 | End: 2022-11-15

## 2022-11-15 RX ORDER — SODIUM CHLORIDE 9 MG/ML
2000 INJECTION INTRAMUSCULAR; INTRAVENOUS; SUBCUTANEOUS ONCE
Refills: 0 | Status: COMPLETED | OUTPATIENT
Start: 2022-11-15 | End: 2022-11-15

## 2022-11-15 RX ORDER — ACETAMINOPHEN 500 MG
650 TABLET ORAL EVERY 6 HOURS
Refills: 0 | Status: DISCONTINUED | OUTPATIENT
Start: 2022-11-15 | End: 2022-11-19

## 2022-11-15 RX ORDER — MORPHINE SULFATE 50 MG/1
2 CAPSULE, EXTENDED RELEASE ORAL EVERY 4 HOURS
Refills: 0 | Status: DISCONTINUED | OUTPATIENT
Start: 2022-11-15 | End: 2022-11-19

## 2022-11-15 RX ORDER — ENOXAPARIN SODIUM 100 MG/ML
40 INJECTION SUBCUTANEOUS EVERY 24 HOURS
Refills: 0 | Status: DISCONTINUED | OUTPATIENT
Start: 2022-11-15 | End: 2022-11-19

## 2022-11-15 RX ORDER — THIAMINE MONONITRATE (VIT B1) 100 MG
100 TABLET ORAL ONCE
Refills: 0 | Status: COMPLETED | OUTPATIENT
Start: 2022-11-15 | End: 2022-11-15

## 2022-11-15 RX ADMIN — Medication 2 MILLIGRAM(S): at 17:45

## 2022-11-15 RX ADMIN — Medication 1 MILLIGRAM(S): at 16:15

## 2022-11-15 RX ADMIN — Medication 2 MILLIGRAM(S): at 21:41

## 2022-11-15 RX ADMIN — Medication 2 MILLIGRAM(S): at 23:08

## 2022-11-15 RX ADMIN — Medication 2 MILLIGRAM(S): at 16:17

## 2022-11-15 RX ADMIN — Medication 50 MILLIGRAM(S): at 16:05

## 2022-11-15 RX ADMIN — Medication 3 MILLIGRAM(S): at 23:09

## 2022-11-15 RX ADMIN — FAMOTIDINE 20 MILLIGRAM(S): 10 INJECTION INTRAVENOUS at 16:04

## 2022-11-15 RX ADMIN — ONDANSETRON 4 MILLIGRAM(S): 8 TABLET, FILM COATED ORAL at 16:05

## 2022-11-15 RX ADMIN — Medication 100 MILLIGRAM(S): at 19:42

## 2022-11-15 RX ADMIN — Medication 100 MILLIGRAM(S): at 16:04

## 2022-11-15 RX ADMIN — SODIUM CHLORIDE 2000 MILLILITER(S): 9 INJECTION INTRAMUSCULAR; INTRAVENOUS; SUBCUTANEOUS at 16:06

## 2022-11-15 RX ADMIN — Medication 50 MILLIGRAM(S): at 21:42

## 2022-11-15 RX ADMIN — Medication 1 GRAM(S): at 16:05

## 2022-11-15 RX ADMIN — Medication 2 MILLIGRAM(S): at 23:10

## 2022-11-15 NOTE — ED ADULT TRIAGE NOTE - GLASGOW COMA SCALE: EYE OPENING, MLM
Patient Education     Preventing Migraine Headaches: Medicines and Lifestyle Changes     Going to bed and getting up at the same time each day, including weekends, may help prevent migraines.     A migraine is a type of severe headache. Having a migraine can be very painful. But there are steps you can take to help prevent migraines.  Medicines to help prevent migraines    Your healthcare provider may prescribe certain medicines to help prevent migraines. These medicines may need to be taken daily. Or they may only need to be taken at times when you re likely to have a migraine.    Common medicines used to help prevent migraines include:  ? Triptans (serotonin receptor agonists)  ? Nonsteroidal anti-inflammatory drugs (such as ibuprofen, available over-the-counter)  ? Beta-blockers  ? Anticonvulsants  ? Tricyclic antidepressants  ? Calcium channel blockers  ? Certain vitamins, minerals, and plant extracts  ? Botulinum toxin injection for certain chronic migraines   ? CGRP (calcitonin gene-related peptide) agnonists are being reviewed by the Food and Drug Administration (FDA)  Lifestyle changes for long-term prevention  Here are some suggestions:    Exercise. Regular exercise can help prevent migraines and improve your health. (If exercise triggers your migraines, talk to your healthcare provider.)    Keep regular habits. Don t skip or delay meals. Drink plenty of water. And go to bed and get up at about the same time each day. This includes weekends.    Try alternative treatments. These are treatments that don't involve the use of medicines or surgery. They may help relieve symptoms and prevent migraines. Some treatment options include biofeedback and acupuncture. Ask your healthcare provider to tell you more about these treatments if you have questions.    Limit caffeine. You may find that caffeine helps relieve pain during an attack. But too much caffeine can also trigger migraines. So, limit the amount of  caffeine you consume.  Date Last Reviewed: 5/1/2018 2000-2018 [employer]. [address] [place] [address], [place] [address]. All rights reserved. This information is not intended as a substitute for professional medical care. Always follow your healthcare professional's instructions.            (E4) spontaneous

## 2022-11-15 NOTE — H&P ADULT - HISTORY OF PRESENT ILLNESS
Mr. Browne is 42 y.o M with pmh of alcohol abuse, prior admission for alcohol withdrawal, alcoholic gastritis Georgina Evelin syndrom, HTN presented from home with 24 hr of epigastric pain.   Hpi obtained from, although he is poor hx due to being under effect of alcohol, he reports that he has been having on and off shakes and morning epigastric abdominal pain a/w nausea and occasional NBNB vomitus on and off over last week, which would get better with alcohol intake, however last few days epigastric pain is worse and today he could not tolerate PO intake which brought him to ED. He denied any SOB, CP, changes in BM, dysuria, fever, chills, cough.   In ED, vs with initial bp 166/122 which improved to 134/96 with pain medication and librium, work up remarkable for lipase 1.5K, LA 2.9, normal LFTs, alcohol level 358, CT abd/pelvis with peripancreatic and duodenum  inflammation, ciwa score 8 on presentation. Pt was given total 150 mg of librium,  4 mg ativan in  ED and admitted for further management of alcohol withdrawal and pancreatitis     Current med list as per pt  EMR was reviewed

## 2022-11-15 NOTE — ED ADULT NURSE REASSESSMENT NOTE - NS ED NURSE REASSESS COMMENT FT1
Pt received awake and alert to person and place. Presents to ED for alcohol withdrawal. CIWA 7 at this time. medicated appropriately. Pt denies any pain at this time. Pt remains tachycardiac - maintaince fluids to be given. Admitted to hospital.

## 2022-11-15 NOTE — ED ADULT NURSE NOTE - OBJECTIVE STATEMENT
41 y/o male who presents for abdominal pain and headache. States he had the pain for 2 days accompanied with "feeling hot". Alert and oriented x3 in NAD. States last drink was 3 weeks ago. CIWA -8. Cardiac monitoring initiated. Remains tachy to 114. No complaints or concerns voiced at this time. Will continue to monitor.

## 2022-11-15 NOTE — ED PROVIDER NOTE - OBJECTIVE STATEMENT
42-year-old male with PMH HTN, alcohol abuse (per chart, pt denies to me ), esophagitis presents with moderate constant generalized tremors/shaking x today.  Also relates mild constant nonradiating epigastric pain.  No palliating/provoking factors.   + Associated with nausea, one episode of nonbloody nonbilious vomiting.  no fever, cp, sob, back pain.

## 2022-11-15 NOTE — ED PROVIDER NOTE - ATTENDING APP SHARED VISIT CONTRIBUTION OF CARE
I have personally seen and examined this pt I have fully participated in the care of this pt I have made amendment to the documentation where appropriate and otherwise hx, exam, and plan as documented by the ACP.

## 2022-11-15 NOTE — ED ADULT TRIAGE NOTE - CHIEF COMPLAINT QUOTE
BIBA- from home  "Dizziness, abd pain and body shaking" EMS  HX ETOH, elevated lipase, N/V, and states last drink 3 weeks ago

## 2022-11-15 NOTE — ED ADULT NURSE NOTE - JUGULAR VENOUS DISTENTION
Discharge Planning Note:    CM spoke to [facility] at the [place], [facility], pre-cert to be restarted at this time. Anticipate pt to downgrade out of ICU later today, facility updated.      Electronically signed by Gisel Quinones RN on 10/7/2022 at 11:49 AM absent

## 2022-11-15 NOTE — H&P ADULT - NSHPPHYSICALEXAM_GEN_ALL_CORE
Vital Signs Last 24 Hrs  T(C): 37.2 (15 Nov 2022 14:53), Max: 37.2 (15 Nov 2022 14:53)  T(F): 98.9 (15 Nov 2022 14:53), Max: 98.9 (15 Nov 2022 14:53)  HR: 121 (15 Nov 2022 19:00) (121 - 133)  BP: 134/96 (15 Nov 2022 19:00) (134/96 - 166/122)  BP(mean): --  RR: 22 (15 Nov 2022 19:00) (22 - 22)  SpO2: 94% (15 Nov 2022 19:00) (94% - 97%)    Parameters below as of 15 Nov 2022 19:00  Patient On (Oxygen Delivery Method): room air     General: NAD,    ENMT: no conjunctival injection, moist oral mucoses, good dentition   Neck and Lymph: no palpable masses in thyroids, no JVD, no lymphadenopathy   Lungs: good air entry b/l, no wheezing/rails/crackles on auscultation, no stridor  CVS: RRR, no M/R/G, no peripheral edema, palpable pedal pulses +2  Abdomen: tender in epigastrium and LUQ, distended, but hepatomegaly or ascites   Extremities: no apparent deformities, preserved ROM  Skin: warm, dry, no rashes,  Neuro: OAX3, apears intoxicated , didn't noted CN abnormalities during my exam, observed moving all 4 ext against gravity and cooperating with physical exam, no apparent loss of sensation.   Pschyc; appropriate thought process

## 2022-11-15 NOTE — H&P ADULT - NSHPLABSRESULTS_GEN_ALL_CORE
LABS:                        16.7   9.56  )-----------( 314      ( 15 Nov 2022 15:59 )             45.7     11-15    137  |  102  |  11  ----------------------------<  139<H>  3.4<L>   |  24  |  1.06    Ca    9.1      15 Nov 2022 15:59  Mg     2.4     11-15    TPro  8.2  /  Alb  3.9  /  TBili  0.6  /  DBili  x   /  AST  55<H>  /  ALT  52  /  AlkPhos  119  11-15              CAPILLARY BLOOD GLUCOSE            RADIOLOGY & ADDITIONAL TESTS:  Results Reviewed:   Imaging Personally Reviewed: as above , ct head w/o acute intracranial changes  Electrocardiogram Personally Reviewed: sinus tachy, lad, no ischemic changes, qtc 447

## 2022-11-15 NOTE — H&P ADULT - ASSESSMENT
Mr. Browne is 42 y.o M with pmh of alcohol abuse, prior admission for alcohol withdrawal, alcoholic gastritis Georgina Evelin syndrom, HTN presented from home with 24 hr of epigastric pain.   Hpi obtained from, although he is poor hx due to being under effect of alcohol, he reports that he has been having on and off shakes and morning epigastric abdominal pain a/w nausea and occasional NBNB vomitus on and off over last week, which would get better with alcohol intake, however last few days epigastric pain is worse and today he could not tolerate PO intake which brought him to ED. He denied any SOB, CP, changes in BM, dysuria, fever, chills, cough.   In ED, vs with initial bp 166/122 which improved to 134/96 with pain medication and librium, work up remarkable for lipase 1.5K, LA 2.9, normal LFTs, alcohol level 358, CT abd/pelvis with peripancreatic and duodenum  inflammation, ciwa score 8 on presentation. Pt was given total 150 mg of librium,  4 mg ativan in  ED and admitted for further management of alcohol withdrawal and pancreatitis       # Alcohol abuse with impending sever alcohol withdrawal   - admit to tele bed  - on presentation socres 8 while alcohol level 358 >> started on librium george and ativan prn symptoms trigered  - mv, thiamin, folic acid    # Acute alcohol pancreatitis   - CLD for now  - IVF, pain regiment base on pain scale, ppi, zofran prn  - will check lipid panel, and RUQ US    # HTN - cont'e to monitor for now    # DVT ppx - lovenox

## 2022-11-15 NOTE — ED ADULT NURSE REASSESSMENT NOTE - NS ED NURSE REASSESS COMMENT FT1
pt increasingly agitated, trying to get out of bed, taking of continuos cardiac monitoring. CIWA noted to be 17. Pt medicated appropriately. Nursing supervisor called to have pt placed on safety watch.

## 2022-11-15 NOTE — ED PROVIDER NOTE - PHYSICAL EXAMINATION
PHYSICAL EXAM:    GENERAL: Alert, chronically ill appearing, non-toxic  SKIN: Warm, pink and dry. MMM.   HEAD: NC, AT, no step offs   EYE: Normal lids/conjunctiva, PERRL, EOMI  ENT: Normal hearing, patent oropharynx. no tongue fasciculations   NECK: +supple. No meningismus, or JVD, +Trachea midline. no tenderness/step offs.   Pulm: Bilateral BS, normal resp effort, no wheezes, stridor, or retractions  CV: RRR, no M/R/G, 2+and = radial pulses  Abd: soft, non-tender, non-distended  Mskel: no erythema, cyanosis, edema. no calf tenderness. +slight tremor on arm extension   Neuro: AAOx3,

## 2022-11-16 LAB
A1C WITH ESTIMATED AVERAGE GLUCOSE RESULT: 6.9 % — HIGH (ref 4–5.6)
ALBUMIN SERPL ELPH-MCNC: 3.6 G/DL — SIGNIFICANT CHANGE UP (ref 3.3–5)
ALP SERPL-CCNC: 108 U/L — SIGNIFICANT CHANGE UP (ref 40–120)
ALT FLD-CCNC: 48 U/L — SIGNIFICANT CHANGE UP (ref 12–78)
ANION GAP SERPL CALC-SCNC: 12 MMOL/L — SIGNIFICANT CHANGE UP (ref 5–17)
AST SERPL-CCNC: 67 U/L — HIGH (ref 15–37)
BASOPHILS # BLD AUTO: 0.09 K/UL — SIGNIFICANT CHANGE UP (ref 0–0.2)
BASOPHILS NFR BLD AUTO: 0.8 % — SIGNIFICANT CHANGE UP (ref 0–2)
BILIRUB SERPL-MCNC: 1 MG/DL — SIGNIFICANT CHANGE UP (ref 0.2–1.2)
BUN SERPL-MCNC: 11 MG/DL — SIGNIFICANT CHANGE UP (ref 7–23)
CALCIUM SERPL-MCNC: 8.5 MG/DL — SIGNIFICANT CHANGE UP (ref 8.5–10.1)
CHLORIDE SERPL-SCNC: 106 MMOL/L — SIGNIFICANT CHANGE UP (ref 96–108)
CHOLEST SERPL-MCNC: 203 MG/DL — HIGH
CO2 SERPL-SCNC: 22 MMOL/L — SIGNIFICANT CHANGE UP (ref 22–31)
CREAT SERPL-MCNC: 1.04 MG/DL — SIGNIFICANT CHANGE UP (ref 0.5–1.3)
EGFR: 91 ML/MIN/1.73M2 — SIGNIFICANT CHANGE UP
EOSINOPHIL # BLD AUTO: 0.24 K/UL — SIGNIFICANT CHANGE UP (ref 0–0.5)
EOSINOPHIL NFR BLD AUTO: 2.2 % — SIGNIFICANT CHANGE UP (ref 0–6)
ESTIMATED AVERAGE GLUCOSE: 151 MG/DL — HIGH (ref 68–114)
GLUCOSE BLDC GLUCOMTR-MCNC: 95 MG/DL — SIGNIFICANT CHANGE UP (ref 70–99)
GLUCOSE SERPL-MCNC: 125 MG/DL — HIGH (ref 70–99)
HCT VFR BLD CALC: 44.8 % — SIGNIFICANT CHANGE UP (ref 39–50)
HDLC SERPL-MCNC: 38 MG/DL — LOW
HGB BLD-MCNC: 15.7 G/DL — SIGNIFICANT CHANGE UP (ref 13–17)
IMM GRANULOCYTES NFR BLD AUTO: 0.5 % — SIGNIFICANT CHANGE UP (ref 0–0.9)
LACTATE SERPL-SCNC: 2.2 MMOL/L — HIGH (ref 0.7–2)
LIPID PNL WITH DIRECT LDL SERPL: SIGNIFICANT CHANGE UP MG/DL
LYMPHOCYTES # BLD AUTO: 3.96 K/UL — HIGH (ref 1–3.3)
LYMPHOCYTES # BLD AUTO: 37 % — SIGNIFICANT CHANGE UP (ref 13–44)
MCHC RBC-ENTMCNC: 28.9 PG — SIGNIFICANT CHANGE UP (ref 27–34)
MCHC RBC-ENTMCNC: 35 G/DL — SIGNIFICANT CHANGE UP (ref 32–36)
MCV RBC AUTO: 82.4 FL — SIGNIFICANT CHANGE UP (ref 80–100)
MONOCYTES # BLD AUTO: 0.67 K/UL — SIGNIFICANT CHANGE UP (ref 0–0.9)
MONOCYTES NFR BLD AUTO: 6.3 % — SIGNIFICANT CHANGE UP (ref 2–14)
NEUTROPHILS # BLD AUTO: 5.69 K/UL — SIGNIFICANT CHANGE UP (ref 1.8–7.4)
NEUTROPHILS NFR BLD AUTO: 53.2 % — SIGNIFICANT CHANGE UP (ref 43–77)
NON HDL CHOLESTEROL: 165 MG/DL — HIGH
NRBC # BLD: 0 /100 WBCS — SIGNIFICANT CHANGE UP (ref 0–0)
PLATELET # BLD AUTO: 254 K/UL — SIGNIFICANT CHANGE UP (ref 150–400)
POTASSIUM SERPL-MCNC: 3.6 MMOL/L — SIGNIFICANT CHANGE UP (ref 3.5–5.3)
POTASSIUM SERPL-SCNC: 3.6 MMOL/L — SIGNIFICANT CHANGE UP (ref 3.5–5.3)
PROT SERPL-MCNC: 7.4 GM/DL — SIGNIFICANT CHANGE UP (ref 6–8.3)
RBC # BLD: 5.44 M/UL — SIGNIFICANT CHANGE UP (ref 4.2–5.8)
RBC # FLD: 11.9 % — SIGNIFICANT CHANGE UP (ref 10.3–14.5)
SODIUM SERPL-SCNC: 140 MMOL/L — SIGNIFICANT CHANGE UP (ref 135–145)
TRIGL SERPL-MCNC: 1611 MG/DL — HIGH
WBC # BLD: 10.7 K/UL — HIGH (ref 3.8–10.5)
WBC # FLD AUTO: 10.7 K/UL — HIGH (ref 3.8–10.5)

## 2022-11-16 PROCEDURE — 99233 SBSQ HOSP IP/OBS HIGH 50: CPT

## 2022-11-16 PROCEDURE — 76705 ECHO EXAM OF ABDOMEN: CPT | Mod: 26

## 2022-11-16 RX ORDER — LOPERAMIDE HCL 2 MG
2 TABLET ORAL
Refills: 0 | Status: DISCONTINUED | OUTPATIENT
Start: 2022-11-16 | End: 2022-11-19

## 2022-11-16 RX ORDER — SODIUM CHLORIDE 9 MG/ML
1000 INJECTION INTRAMUSCULAR; INTRAVENOUS; SUBCUTANEOUS ONCE
Refills: 0 | Status: COMPLETED | OUTPATIENT
Start: 2022-11-16 | End: 2022-11-16

## 2022-11-16 RX ADMIN — Medication 2 MILLIGRAM(S): at 22:35

## 2022-11-16 RX ADMIN — PANTOPRAZOLE SODIUM 40 MILLIGRAM(S): 20 TABLET, DELAYED RELEASE ORAL at 08:06

## 2022-11-16 RX ADMIN — ONDANSETRON 4 MILLIGRAM(S): 8 TABLET, FILM COATED ORAL at 22:35

## 2022-11-16 RX ADMIN — Medication 1 TABLET(S): at 11:26

## 2022-11-16 RX ADMIN — Medication 50 MILLIGRAM(S): at 04:15

## 2022-11-16 RX ADMIN — Medication 2 MILLIGRAM(S): at 04:14

## 2022-11-16 RX ADMIN — Medication 50 MILLIGRAM(S): at 21:49

## 2022-11-16 RX ADMIN — ENOXAPARIN SODIUM 40 MILLIGRAM(S): 100 INJECTION SUBCUTANEOUS at 08:06

## 2022-11-16 RX ADMIN — Medication 100 MILLIGRAM(S): at 11:26

## 2022-11-16 RX ADMIN — Medication 50 MILLIGRAM(S): at 11:26

## 2022-11-16 RX ADMIN — SODIUM CHLORIDE 1000 MILLILITER(S): 9 INJECTION INTRAMUSCULAR; INTRAVENOUS; SUBCUTANEOUS at 09:55

## 2022-11-16 RX ADMIN — Medication 1 MILLIGRAM(S): at 11:26

## 2022-11-16 RX ADMIN — Medication 2 MILLIGRAM(S): at 17:38

## 2022-11-16 RX ADMIN — Medication 50 MILLIGRAM(S): at 08:06

## 2022-11-16 NOTE — PATIENT PROFILE ADULT - FUNCTIONAL ASSESSMENT - BASIC MOBILITY 6.
4-calculated by average/Not able to assess (calculate score using [facility] averaging method)

## 2022-11-16 NOTE — PATIENT PROFILE ADULT - NSPRESCRALCFREQ_GEN_A_NUR
Alert-The patient is alert, awake and responds to voice. The patient is oriented to time, place, and person. The triage nurse is able to obtain subjective information.
4 or more times a week

## 2022-11-16 NOTE — ED ADULT NURSE REASSESSMENT NOTE - NS ED NURSE REASSESS COMMENT FT1
covering for primary RN Holden, Pt is sleeping at this time remained on the monitors, tech at bedside, in no sign of distress, will continue to monitor

## 2022-11-16 NOTE — PROGRESS NOTE ADULT - SUBJECTIVE AND OBJECTIVE BOX
Patient is a 42y old  Male who presents with a chief complaint of pancreatitis, alcohol withdrawal (15 Nov 2022 20:59)    INTERVAL HPI/OVERNIGHT EVENTS: Patients seen and examined at bedside this morning. No acute events overnight. Pt reports he last drank 2 days ago. Wants to stop drinking alcohol.     MEDICATIONS  (STANDING):  chlordiazePOXIDE   Oral   chlordiazePOXIDE 50 milliGRAM(s) Oral every 8 hours  enoxaparin Injectable 40 milliGRAM(s) SubCutaneous every 24 hours  folic acid 1 milliGRAM(s) Oral daily  lactated ringers. 1000 milliLiter(s) (100 mL/Hr) IV Continuous <Continuous>  multivitamin 1 Tablet(s) Oral daily  pantoprazole    Tablet 40 milliGRAM(s) Oral before breakfast  thiamine 100 milliGRAM(s) Oral daily    MEDICATIONS  (PRN):  acetaminophen     Tablet .. 650 milliGRAM(s) Oral every 6 hours PRN Temp greater or equal to 38C (100.4F), Mild Pain (1 - 3)  aluminum hydroxide/magnesium hydroxide/simethicone Suspension 30 milliLiter(s) Oral every 4 hours PRN Dyspepsia  LORazepam   Injectable 2 milliGRAM(s) IV Push every 1 hour PRN CIWA-Ar score 8 or greater  melatonin 3 milliGRAM(s) Oral at bedtime PRN Insomnia  morphine  - Injectable 2 milliGRAM(s) IV Push every 4 hours PRN Severe Pain (7 - 10)  morphine  - Injectable 1 milliGRAM(s) IV Push every 4 hours PRN Moderate Pain (4 - 6)  ondansetron Injectable 4 milliGRAM(s) IV Push every 8 hours PRN Nausea and/or Vomiting    Allergies    No Known Allergies    Intolerances      REVIEW OF SYSTEMS:  All other systems reviewed and are negative    Vital Signs Last 24 Hrs  T(C): 36.5 (16 Nov 2022 13:04), Max: 36.7 (16 Nov 2022 11:16)  T(F): 97.7 (16 Nov 2022 13:04), Max: 98 (16 Nov 2022 11:16)  HR: 110 (16 Nov 2022 13:10) (100 - 121)  BP: 137/105 (16 Nov 2022 13:04) (109/86 - 137/105)  BP(mean): --  RR: 18 (16 Nov 2022 13:04) (18 - 25)  SpO2: 99% (16 Nov 2022 13:04) (94% - 100%)    Parameters below as of 16 Nov 2022 13:04  Patient On (Oxygen Delivery Method): room air      Daily     Daily   I&O's Summary    CAPILLARY BLOOD GLUCOSE        PHYSICAL EXAM:  GENERAL: NAD, disheveled   HEAD:  Atraumatic, Normocephalic  EYES: EOMI, PERRLA, conjunctiva and sclera clear  ENMT: No tonsillar erythema, exudates, or enlargement; Moist mucous membranes, Good dentition, No lesions  NECK: Supple, No JVD, Normal thyroid  NERVOUS SYSTEM:  Alert & Oriented X3, Good concentration; Motor Strength 5/5 B/L upper and lower extremities; DTRs 2+ intact and symmetric  CHEST/LUNG: Clear to percussion bilaterally; No rales, rhonchi, wheezing, or rubs  HEART: Regular rate and rhythm; No murmurs, rubs, or gallops  ABDOMEN: Soft,tender, Nondistended; Bowel sounds present  EXTREMITIES:  2+ Peripheral Pulses, No clubbing, cyanosis, or edema  LYMPH: No lymphadenopathy noted  SKIN: No rashes or lesions    Labs                          15.7   10.70 )-----------( 254      ( 16 Nov 2022 06:20 )             44.8     11-16    140  |  106  |  11  ----------------------------<  125<H>  3.6   |  22  |  1.04    Ca    8.5      16 Nov 2022 06:20  Mg     2.4     11-15    TPro  7.4  /  Alb  3.6  /  TBili  1.0  /  DBili  x   /  AST  67<H>  /  ALT  48  /  AlkPhos  108  11-16                            Radiology and Imaging reviewed.

## 2022-11-16 NOTE — PATIENT PROFILE ADULT - FALL HARM RISK - HARM RISK INTERVENTIONS

## 2022-11-17 LAB
ALBUMIN SERPL ELPH-MCNC: 3.4 G/DL — SIGNIFICANT CHANGE UP (ref 3.3–5)
ALP SERPL-CCNC: 115 U/L — SIGNIFICANT CHANGE UP (ref 40–120)
ALT FLD-CCNC: 47 U/L — SIGNIFICANT CHANGE UP (ref 12–78)
ANION GAP SERPL CALC-SCNC: 10 MMOL/L — SIGNIFICANT CHANGE UP (ref 5–17)
ANION GAP SERPL CALC-SCNC: 12 MMOL/L — SIGNIFICANT CHANGE UP (ref 5–17)
AST SERPL-CCNC: 57 U/L — HIGH (ref 15–37)
BILIRUB SERPL-MCNC: 1.4 MG/DL — HIGH (ref 0.2–1.2)
BUN SERPL-MCNC: 3 MG/DL — LOW (ref 7–23)
BUN SERPL-MCNC: 5 MG/DL — LOW (ref 7–23)
CALCIUM SERPL-MCNC: 8.7 MG/DL — SIGNIFICANT CHANGE UP (ref 8.5–10.1)
CALCIUM SERPL-MCNC: 9.3 MG/DL — SIGNIFICANT CHANGE UP (ref 8.5–10.1)
CHLORIDE SERPL-SCNC: 105 MMOL/L — SIGNIFICANT CHANGE UP (ref 96–108)
CHLORIDE SERPL-SCNC: 107 MMOL/L — SIGNIFICANT CHANGE UP (ref 96–108)
CO2 SERPL-SCNC: 19 MMOL/L — LOW (ref 22–31)
CO2 SERPL-SCNC: 23 MMOL/L — SIGNIFICANT CHANGE UP (ref 22–31)
CREAT SERPL-MCNC: 0.97 MG/DL — SIGNIFICANT CHANGE UP (ref 0.5–1.3)
CREAT SERPL-MCNC: 1.26 MG/DL — SIGNIFICANT CHANGE UP (ref 0.5–1.3)
EGFR: 73 ML/MIN/1.73M2 — SIGNIFICANT CHANGE UP
EGFR: 99 ML/MIN/1.73M2 — SIGNIFICANT CHANGE UP
GLUCOSE SERPL-MCNC: 140 MG/DL — HIGH (ref 70–99)
GLUCOSE SERPL-MCNC: 181 MG/DL — HIGH (ref 70–99)
HCT VFR BLD CALC: 39.3 % — SIGNIFICANT CHANGE UP (ref 39–50)
HGB BLD-MCNC: 14.4 G/DL — SIGNIFICANT CHANGE UP (ref 13–17)
MAGNESIUM SERPL-MCNC: 1.8 MG/DL — SIGNIFICANT CHANGE UP (ref 1.6–2.6)
MCHC RBC-ENTMCNC: 29.4 PG — SIGNIFICANT CHANGE UP (ref 27–34)
MCHC RBC-ENTMCNC: 36.6 G/DL — HIGH (ref 32–36)
MCV RBC AUTO: 80.2 FL — SIGNIFICANT CHANGE UP (ref 80–100)
NRBC # BLD: 0 /100 WBCS — SIGNIFICANT CHANGE UP (ref 0–0)
PHOSPHATE SERPL-MCNC: 2.6 MG/DL — SIGNIFICANT CHANGE UP (ref 2.5–4.5)
PLATELET # BLD AUTO: 186 K/UL — SIGNIFICANT CHANGE UP (ref 150–400)
POTASSIUM SERPL-MCNC: 2.8 MMOL/L — CRITICAL LOW (ref 3.5–5.3)
POTASSIUM SERPL-MCNC: 3.7 MMOL/L — SIGNIFICANT CHANGE UP (ref 3.5–5.3)
POTASSIUM SERPL-SCNC: 2.8 MMOL/L — CRITICAL LOW (ref 3.5–5.3)
POTASSIUM SERPL-SCNC: 3.7 MMOL/L — SIGNIFICANT CHANGE UP (ref 3.5–5.3)
PROT SERPL-MCNC: 7.2 GM/DL — SIGNIFICANT CHANGE UP (ref 6–8.3)
RBC # BLD: 4.9 M/UL — SIGNIFICANT CHANGE UP (ref 4.2–5.8)
RBC # FLD: 11.9 % — SIGNIFICANT CHANGE UP (ref 10.3–14.5)
SODIUM SERPL-SCNC: 138 MMOL/L — SIGNIFICANT CHANGE UP (ref 135–145)
SODIUM SERPL-SCNC: 138 MMOL/L — SIGNIFICANT CHANGE UP (ref 135–145)
TRIGL SERPL-MCNC: 1804 MG/DL — HIGH
WBC # BLD: 7.56 K/UL — SIGNIFICANT CHANGE UP (ref 3.8–10.5)
WBC # FLD AUTO: 7.56 K/UL — SIGNIFICANT CHANGE UP (ref 3.8–10.5)

## 2022-11-17 PROCEDURE — 99233 SBSQ HOSP IP/OBS HIGH 50: CPT

## 2022-11-17 RX ORDER — AMLODIPINE BESYLATE 2.5 MG/1
10 TABLET ORAL DAILY
Refills: 0 | Status: DISCONTINUED | OUTPATIENT
Start: 2022-11-17 | End: 2022-11-19

## 2022-11-17 RX ORDER — POTASSIUM CHLORIDE 20 MEQ
20 PACKET (EA) ORAL ONCE
Refills: 0 | Status: COMPLETED | OUTPATIENT
Start: 2022-11-17 | End: 2022-11-17

## 2022-11-17 RX ORDER — POTASSIUM CHLORIDE 20 MEQ
40 PACKET (EA) ORAL EVERY 4 HOURS
Refills: 0 | Status: COMPLETED | OUTPATIENT
Start: 2022-11-17 | End: 2022-11-17

## 2022-11-17 RX ADMIN — Medication 2 MILLIGRAM(S): at 20:09

## 2022-11-17 RX ADMIN — Medication 2 MILLIGRAM(S): at 18:57

## 2022-11-17 RX ADMIN — Medication 50 MILLIGRAM(S): at 05:57

## 2022-11-17 RX ADMIN — Medication 100 MILLIGRAM(S): at 11:24

## 2022-11-17 RX ADMIN — ENOXAPARIN SODIUM 40 MILLIGRAM(S): 100 INJECTION SUBCUTANEOUS at 05:57

## 2022-11-17 RX ADMIN — Medication 1 TABLET(S): at 11:24

## 2022-11-17 RX ADMIN — Medication 50 MILLIGRAM(S): at 16:05

## 2022-11-17 RX ADMIN — Medication 40 MILLIEQUIVALENT(S): at 16:06

## 2022-11-17 RX ADMIN — Medication 40 MILLIEQUIVALENT(S): at 11:23

## 2022-11-17 RX ADMIN — Medication 1 MILLIGRAM(S): at 11:24

## 2022-11-17 RX ADMIN — Medication 50 MILLIEQUIVALENT(S): at 11:23

## 2022-11-17 RX ADMIN — AMLODIPINE BESYLATE 10 MILLIGRAM(S): 2.5 TABLET ORAL at 17:46

## 2022-11-17 RX ADMIN — Medication 2 MILLIGRAM(S): at 05:58

## 2022-11-17 RX ADMIN — PANTOPRAZOLE SODIUM 40 MILLIGRAM(S): 20 TABLET, DELAYED RELEASE ORAL at 05:57

## 2022-11-17 NOTE — PROGRESS NOTE ADULT - SUBJECTIVE AND OBJECTIVE BOX
Patient is a 42y old  Male who presents with a chief complaint of pancreatitis, alcohol withdrawal (16 Nov 2022 15:28)    INTERVAL HPI/OVERNIGHT EVENTS: Patients seen and examined at bedside this morning. No acute events overnight. Pt reports only tolerating liquids. CIWA 5    MEDICATIONS  (STANDING):  chlordiazePOXIDE   Oral   chlordiazePOXIDE 50 milliGRAM(s) Oral every 8 hours  enoxaparin Injectable 40 milliGRAM(s) SubCutaneous every 24 hours  folic acid 1 milliGRAM(s) Oral daily  lactated ringers. 1000 milliLiter(s) (100 mL/Hr) IV Continuous <Continuous>  loperamide 2 milliGRAM(s) Oral two times a day  multivitamin 1 Tablet(s) Oral daily  pantoprazole    Tablet 40 milliGRAM(s) Oral before breakfast  potassium chloride   Powder 40 milliEquivalent(s) Oral every 4 hours  thiamine 100 milliGRAM(s) Oral daily    MEDICATIONS  (PRN):  acetaminophen     Tablet .. 650 milliGRAM(s) Oral every 6 hours PRN Temp greater or equal to 38C (100.4F), Mild Pain (1 - 3)  aluminum hydroxide/magnesium hydroxide/simethicone Suspension 30 milliLiter(s) Oral every 4 hours PRN Dyspepsia  LORazepam   Injectable 2 milliGRAM(s) IV Push every 1 hour PRN CIWA-Ar score 8 or greater  melatonin 3 milliGRAM(s) Oral at bedtime PRN Insomnia  morphine  - Injectable 2 milliGRAM(s) IV Push every 4 hours PRN Severe Pain (7 - 10)  morphine  - Injectable 1 milliGRAM(s) IV Push every 4 hours PRN Moderate Pain (4 - 6)  ondansetron Injectable 4 milliGRAM(s) IV Push every 8 hours PRN Nausea and/or Vomiting    Allergies    No Known Allergies    Intolerances      REVIEW OF SYSTEMS:  All other systems reviewed and are negative    Vital Signs Last 24 Hrs  T(C): 36.9 (17 Nov 2022 10:47), Max: 36.9 (17 Nov 2022 10:47)  T(F): 98.4 (17 Nov 2022 10:47), Max: 98.4 (17 Nov 2022 10:47)  HR: 115 (17 Nov 2022 12:57) (95 - 126)  BP: 120/83 (17 Nov 2022 12:57) (120/83 - 144/96)  BP(mean): --  RR: 18 (17 Nov 2022 10:47) (18 - 19)  SpO2: 99% (17 Nov 2022 10:47) (98% - 99%)    Parameters below as of 17 Nov 2022 10:47  Patient On (Oxygen Delivery Method): room air      Daily     Daily   I&O's Summary    16 Nov 2022 07:01  -  17 Nov 2022 07:00  --------------------------------------------------------  IN: 240 mL / OUT: 0 mL / NET: 240 mL    17 Nov 2022 07:01  -  17 Nov 2022 14:30  --------------------------------------------------------  IN: 375 mL / OUT: 0 mL / NET: 375 mL      CAPILLARY BLOOD GLUCOSE      POCT Blood Glucose.: 95 mg/dL (16 Nov 2022 21:52)    PHYSICAL EXAM:  GENERAL: NAD, disheveled   HEAD:  Atraumatic, Normocephalic  EYES: EOMI, PERRLA, conjunctiva and sclera clear  ENMT: No tonsillar erythema, exudates, or enlargement; Moist mucous membranes, Good dentition, No lesions  NECK: Supple, No JVD, Normal thyroid  NERVOUS SYSTEM:  Alert & Oriented X3, Good concentration; Motor Strength 5/5 B/L upper and lower extremities; DTRs 2+ intact and symmetric  CHEST/LUNG: Clear to percussion bilaterally; No rales, rhonchi, wheezing, or rubs  HEART: Regular rate and rhythm; No murmurs, rubs, or gallops  ABDOMEN: Soft,tender, Nondistended; Bowel sounds present  EXTREMITIES:  2+ Peripheral Pulses, No clubbing, cyanosis, or edema. b/l hand tremors  LYMPH: No lymphadenopathy noted  SKIN: No rashes or lesions  Labs                          14.4   7.56  )-----------( 186      ( 17 Nov 2022 05:40 )             39.3     11-17    138  |  105  |  5<L>  ----------------------------<  140<H>  2.8<LL>   |  23  |  0.97    Ca    8.7      17 Nov 2022 05:40  Phos  2.6     11-17  Mg     1.8     11-17    TPro  7.2  /  Alb  3.4  /  TBili  1.4<H>  /  DBili  x   /  AST  57<H>  /  ALT  47  /  AlkPhos  115  11-17                            Radiology and Imaging reviewed.

## 2022-11-18 LAB
ANION GAP SERPL CALC-SCNC: 9 MMOL/L — SIGNIFICANT CHANGE UP (ref 5–17)
BUN SERPL-MCNC: 5 MG/DL — LOW (ref 7–23)
CALCIUM SERPL-MCNC: 9.7 MG/DL — SIGNIFICANT CHANGE UP (ref 8.5–10.1)
CHLORIDE SERPL-SCNC: 106 MMOL/L — SIGNIFICANT CHANGE UP (ref 96–108)
CO2 SERPL-SCNC: 22 MMOL/L — SIGNIFICANT CHANGE UP (ref 22–31)
CREAT SERPL-MCNC: 0.92 MG/DL — SIGNIFICANT CHANGE UP (ref 0.5–1.3)
EGFR: 106 ML/MIN/1.73M2 — SIGNIFICANT CHANGE UP
GLUCOSE SERPL-MCNC: 131 MG/DL — HIGH (ref 70–99)
MAGNESIUM SERPL-MCNC: 2 MG/DL — SIGNIFICANT CHANGE UP (ref 1.6–2.6)
PHOSPHATE SERPL-MCNC: 2.3 MG/DL — LOW (ref 2.5–4.5)
POTASSIUM SERPL-MCNC: 3.3 MMOL/L — LOW (ref 3.5–5.3)
POTASSIUM SERPL-SCNC: 3.3 MMOL/L — LOW (ref 3.5–5.3)
SODIUM SERPL-SCNC: 137 MMOL/L — SIGNIFICANT CHANGE UP (ref 135–145)

## 2022-11-18 PROCEDURE — 99233 SBSQ HOSP IP/OBS HIGH 50: CPT

## 2022-11-18 RX ORDER — POTASSIUM CHLORIDE 20 MEQ
40 PACKET (EA) ORAL ONCE
Refills: 0 | Status: COMPLETED | OUTPATIENT
Start: 2022-11-18 | End: 2022-11-18

## 2022-11-18 RX ORDER — METOPROLOL TARTRATE 50 MG
12.5 TABLET ORAL
Refills: 0 | Status: DISCONTINUED | OUTPATIENT
Start: 2022-11-18 | End: 2022-11-19

## 2022-11-18 RX ORDER — ATORVASTATIN CALCIUM 80 MG/1
40 TABLET, FILM COATED ORAL AT BEDTIME
Refills: 0 | Status: DISCONTINUED | OUTPATIENT
Start: 2022-11-18 | End: 2022-11-19

## 2022-11-18 RX ORDER — SODIUM,POTASSIUM PHOSPHATES 278-250MG
1 POWDER IN PACKET (EA) ORAL THREE TIMES A DAY
Refills: 0 | Status: COMPLETED | OUTPATIENT
Start: 2022-11-18 | End: 2022-11-19

## 2022-11-18 RX ADMIN — Medication 40 MILLIEQUIVALENT(S): at 12:06

## 2022-11-18 RX ADMIN — Medication 2 MILLIGRAM(S): at 10:02

## 2022-11-18 RX ADMIN — Medication 100 MILLIGRAM(S): at 12:07

## 2022-11-18 RX ADMIN — PANTOPRAZOLE SODIUM 40 MILLIGRAM(S): 20 TABLET, DELAYED RELEASE ORAL at 05:17

## 2022-11-18 RX ADMIN — Medication 2 MILLIGRAM(S): at 00:01

## 2022-11-18 RX ADMIN — Medication 1 TABLET(S): at 12:07

## 2022-11-18 RX ADMIN — Medication 1 PACKET(S): at 22:42

## 2022-11-18 RX ADMIN — Medication 12.5 MILLIGRAM(S): at 22:39

## 2022-11-18 RX ADMIN — Medication 50 MILLIGRAM(S): at 17:27

## 2022-11-18 RX ADMIN — Medication 2 MILLIGRAM(S): at 09:20

## 2022-11-18 RX ADMIN — ATORVASTATIN CALCIUM 40 MILLIGRAM(S): 80 TABLET, FILM COATED ORAL at 22:42

## 2022-11-18 RX ADMIN — Medication 1 PACKET(S): at 12:05

## 2022-11-18 RX ADMIN — Medication 50 MILLIGRAM(S): at 05:15

## 2022-11-18 RX ADMIN — Medication 2 MILLIGRAM(S): at 22:42

## 2022-11-18 RX ADMIN — Medication 1 MILLIGRAM(S): at 12:07

## 2022-11-18 RX ADMIN — Medication 2 MILLIGRAM(S): at 05:16

## 2022-11-18 RX ADMIN — AMLODIPINE BESYLATE 10 MILLIGRAM(S): 2.5 TABLET ORAL at 05:16

## 2022-11-18 RX ADMIN — Medication 2 MILLIGRAM(S): at 07:21

## 2022-11-18 RX ADMIN — Medication 12.5 MILLIGRAM(S): at 09:32

## 2022-11-18 RX ADMIN — ENOXAPARIN SODIUM 40 MILLIGRAM(S): 100 INJECTION SUBCUTANEOUS at 08:21

## 2022-11-18 NOTE — PROGRESS NOTE ADULT - ASSESSMENT
Mr. Browne is 42 y.o M with pmh of alcohol abuse, prior admission for alcohol withdrawal, alcoholic gastritis Georgina Evelin syndrom, HTN presented from home with 24 hr of epigastric pain.   Hpi obtained from, although he is poor hx due to being under effect of alcohol, he reports that he has been having on and off shakes and morning epigastric abdominal pain a/w nausea and occasional NBNB vomitus on and off over last week, which would get better with alcohol intake, however last few days epigastric pain is worse and today he could not tolerate PO intake which brought him to ED. He denied any SOB, CP, changes in BM, dysuria, fever, chills, cough.   In ED, vs with initial bp 166/122 which improved to 134/96 with pain medication and librium, work up remarkable for lipase 1.5K, LA 2.9, normal LFTs, alcohol level 358, CT abd/pelvis with peripancreatic and duodenum  inflammation, ciwa score 8 on presentation. Pt was given total 150 mg of librium,  4 mg ativan in  ED and admitted for further management of alcohol withdrawal and pancreatitis       # Alcohol abuse with impending sever alcohol withdrawal   - admit to tele bed  - on presentation socres 8 while alcohol level 358 >> started on librium george and ativan prn symptoms trigered  (11/16) CIWA elevated.   - mv, thiamin, folic acid    # Acute alcohol pancreatitis   - CLD for now. advance diet as tolereated  - IVF, pain regiment base on pain scale, ppi, zofran prn  RUQ US: reviewed. Fatty liver     # HTN - cont'e to monitor for now    # DVT ppx - lovenox      
Mr. Browne is 42 y.o M with pmh of alcohol abuse, prior admission for alcohol withdrawal, alcoholic gastritis Georgina Evelin syndrom, HTN presented from home with 24 hr of epigastric pain.   Hpi obtained from, although he is poor hx due to being under effect of alcohol, he reports that he has been having on and off shakes and morning epigastric abdominal pain a/w nausea and occasional NBNB vomitus on and off over last week, which would get better with alcohol intake, however last few days epigastric pain is worse and today he could not tolerate PO intake which brought him to ED. He denied any SOB, CP, changes in BM, dysuria, fever, chills, cough.   In ED, vs with initial bp 166/122 which improved to 134/96 with pain medication and librium, work up remarkable for lipase 1.5K, LA 2.9, normal LFTs, alcohol level 358, CT abd/pelvis with peripancreatic and duodenum  inflammation, ciwa score 8 on presentation. Pt was given total 150 mg of librium,  4 mg ativan in  ED and admitted for further management of alcohol withdrawal and pancreatitis       # Alcohol abuse with impending sever alcohol withdrawal   - admit to tele bed  - on presentation socres 8 while alcohol level 358 >> started on librium george and ativan prn symptoms trigered  (11/16) CIWA elevated.   (11/17) CIWA 5. C/W currently management  (11/18) CIWA remains elevated. C/W librium taper. advanced nursing supravision   - mv, thiamin, folic acid    # Acute alcohol pancreatitis   - CLD for now. advance diet as tolereated  - IVF, pain regiment base on pain scale, ppi, zofran prn  RUQ US: reviewed. Fatty liver     # HTN - cont'e to monitor for now    # DVT ppx - lovenox  
Mr. Browne is 42 y.o M with pmh of alcohol abuse, prior admission for alcohol withdrawal, alcoholic gastritis Georgina Evelin syndrom, HTN presented from home with 24 hr of epigastric pain.   Hpi obtained from, although he is poor hx due to being under effect of alcohol, he reports that he has been having on and off shakes and morning epigastric abdominal pain a/w nausea and occasional NBNB vomitus on and off over last week, which would get better with alcohol intake, however last few days epigastric pain is worse and today he could not tolerate PO intake which brought him to ED. He denied any SOB, CP, changes in BM, dysuria, fever, chills, cough.   In ED, vs with initial bp 166/122 which improved to 134/96 with pain medication and librium, work up remarkable for lipase 1.5K, LA 2.9, normal LFTs, alcohol level 358, CT abd/pelvis with peripancreatic and duodenum  inflammation, ciwa score 8 on presentation. Pt was given total 150 mg of librium,  4 mg ativan in  ED and admitted for further management of alcohol withdrawal and pancreatitis       # Alcohol abuse with impending sever alcohol withdrawal   - admit to tele bed  - on presentation socres 8 while alcohol level 358 >> started on librium george and ativan prn symptoms trigered  (11/16) CIWA elevated.   (11/17) CIWA 5. C/W currently management  - mv, thiamin, folic acid    # Acute alcohol pancreatitis   - CLD for now. advance diet as tolereated  - IVF, pain regiment base on pain scale, ppi, zofran prn  RUQ US: reviewed. Fatty liver     # HTN - cont'e to monitor for now    # DVT ppx - lovenox

## 2022-11-18 NOTE — PROGRESS NOTE ADULT - SUBJECTIVE AND OBJECTIVE BOX
Patient is a 42y old  Male who presents with a chief complaint of pancreatitis, alcohol withdrawal (17 Nov 2022 14:29)    INTERVAL HPI/OVERNIGHT EVENTS: Patients seen and examined at bedside this morning. No acute events overnight. Pt reports    MEDICATIONS  (STANDING):  amLODIPine   Tablet 10 milliGRAM(s) Oral daily  chlordiazePOXIDE   Oral   chlordiazePOXIDE 50 milliGRAM(s) Oral every 12 hours  chlordiazePOXIDE 50 milliGRAM(s) Oral once  enoxaparin Injectable 40 milliGRAM(s) SubCutaneous every 24 hours  folic acid 1 milliGRAM(s) Oral daily  loperamide 2 milliGRAM(s) Oral two times a day  metoprolol tartrate 12.5 milliGRAM(s) Oral two times a day  multivitamin 1 Tablet(s) Oral daily  pantoprazole    Tablet 40 milliGRAM(s) Oral before breakfast  potassium phosphate / sodium phosphate Powder (PHOS-NaK) 1 Packet(s) Oral three times a day  thiamine 100 milliGRAM(s) Oral daily    MEDICATIONS  (PRN):  acetaminophen     Tablet .. 650 milliGRAM(s) Oral every 6 hours PRN Temp greater or equal to 38C (100.4F), Mild Pain (1 - 3)  aluminum hydroxide/magnesium hydroxide/simethicone Suspension 30 milliLiter(s) Oral every 4 hours PRN Dyspepsia  LORazepam   Injectable 2 milliGRAM(s) IV Push every 1 hour PRN CIWA-Ar score 8 or greater  melatonin 3 milliGRAM(s) Oral at bedtime PRN Insomnia  morphine  - Injectable 2 milliGRAM(s) IV Push every 4 hours PRN Severe Pain (7 - 10)  morphine  - Injectable 1 milliGRAM(s) IV Push every 4 hours PRN Moderate Pain (4 - 6)  ondansetron Injectable 4 milliGRAM(s) IV Push every 8 hours PRN Nausea and/or Vomiting    Allergies    No Known Allergies    Intolerances      REVIEW OF SYSTEMS:  All other systems reviewed and are negative    Vital Signs Last 24 Hrs  T(C): 36.4 (18 Nov 2022 10:21), Max: 36.7 (17 Nov 2022 16:01)  T(F): 97.6 (18 Nov 2022 10:21), Max: 98.1 (17 Nov 2022 16:01)  HR: 124 (18 Nov 2022 10:21) (110 - 129)  BP: 137/90 (18 Nov 2022 10:21) (133/93 - 144/105)  BP(mean): --  RR: 18 (18 Nov 2022 10:21) (18 - 18)  SpO2: 98% (18 Nov 2022 10:21) (97% - 100%)    Parameters below as of 18 Nov 2022 10:21  Patient On (Oxygen Delivery Method): room air      Daily     Daily   I&O's Summary    17 Nov 2022 07:01  -  18 Nov 2022 07:00  --------------------------------------------------------  IN: 375 mL / OUT: 0 mL / NET: 375 mL    18 Nov 2022 07:01  -  18 Nov 2022 14:05  --------------------------------------------------------  IN: 236 mL / OUT: 0 mL / NET: 236 mL      CAPILLARY BLOOD GLUCOSE        PHYSICAL EXAM:  GENERAL: NAD, well-groomed, well-developed  HEAD:  Atraumatic, Normocephalic  EYES: EOMI, PERRLA, conjunctiva and sclera clear  ENMT: No tonsillar erythema, exudates, or enlargement; Moist mucous membranes, Good dentition, No lesions  NECK: Supple, No JVD, Normal thyroid  NERVOUS SYSTEM:  Alert & Oriented X3, Good concentration; Motor Strength 5/5 B/L upper and lower extremities; DTRs 2+ intact and symmetric  CHEST/LUNG: Clear to percussion bilaterally; No rales, rhonchi, wheezing, or rubs  HEART: Regular rate and rhythm; No murmurs, rubs, or gallops  ABDOMEN: Soft, Nontender, Nondistended; Bowel sounds present  EXTREMITIES:  2+ Peripheral Pulses, No clubbing, cyanosis, or edema  LYMPH: No lymphadenopathy noted  SKIN: No rashes or lesions    Labs                          14.4   7.56  )-----------( 186      ( 17 Nov 2022 05:40 )             39.3     11-18    137  |  106  |  5<L>  ----------------------------<  131<H>  3.3<L>   |  22  |  0.92    Ca    9.7      18 Nov 2022 07:20  Phos  2.3     11-18  Mg     2.0     11-18    TPro  7.2  /  Alb  3.4  /  TBili  1.4<H>  /  DBili  x   /  AST  57<H>  /  ALT  47  /  AlkPhos  115  11-17                            Radiology and Imaging reviewed. Patient is a 42y old  Male who presents with a chief complaint of pancreatitis, alcohol withdrawal (17 Nov 2022 14:29)    INTERVAL HPI/OVERNIGHT EVENTS: Patients seen and examined at bedside this morning. No acute events overnight. Pt very confused. CIWA: 15. Seen at bedside with brother.     MEDICATIONS  (STANDING):  amLODIPine   Tablet 10 milliGRAM(s) Oral daily  chlordiazePOXIDE   Oral   chlordiazePOXIDE 50 milliGRAM(s) Oral every 12 hours  chlordiazePOXIDE 50 milliGRAM(s) Oral once  enoxaparin Injectable 40 milliGRAM(s) SubCutaneous every 24 hours  folic acid 1 milliGRAM(s) Oral daily  loperamide 2 milliGRAM(s) Oral two times a day  metoprolol tartrate 12.5 milliGRAM(s) Oral two times a day  multivitamin 1 Tablet(s) Oral daily  pantoprazole    Tablet 40 milliGRAM(s) Oral before breakfast  potassium phosphate / sodium phosphate Powder (PHOS-NaK) 1 Packet(s) Oral three times a day  thiamine 100 milliGRAM(s) Oral daily    MEDICATIONS  (PRN):  acetaminophen     Tablet .. 650 milliGRAM(s) Oral every 6 hours PRN Temp greater or equal to 38C (100.4F), Mild Pain (1 - 3)  aluminum hydroxide/magnesium hydroxide/simethicone Suspension 30 milliLiter(s) Oral every 4 hours PRN Dyspepsia  LORazepam   Injectable 2 milliGRAM(s) IV Push every 1 hour PRN CIWA-Ar score 8 or greater  melatonin 3 milliGRAM(s) Oral at bedtime PRN Insomnia  morphine  - Injectable 2 milliGRAM(s) IV Push every 4 hours PRN Severe Pain (7 - 10)  morphine  - Injectable 1 milliGRAM(s) IV Push every 4 hours PRN Moderate Pain (4 - 6)  ondansetron Injectable 4 milliGRAM(s) IV Push every 8 hours PRN Nausea and/or Vomiting    Allergies    No Known Allergies    Intolerances      REVIEW OF SYSTEMS:  All other systems reviewed and are negative    Vital Signs Last 24 Hrs  T(C): 36.4 (18 Nov 2022 10:21), Max: 36.7 (17 Nov 2022 16:01)  T(F): 97.6 (18 Nov 2022 10:21), Max: 98.1 (17 Nov 2022 16:01)  HR: 124 (18 Nov 2022 10:21) (110 - 129)  BP: 137/90 (18 Nov 2022 10:21) (133/93 - 144/105)  BP(mean): --  RR: 18 (18 Nov 2022 10:21) (18 - 18)  SpO2: 98% (18 Nov 2022 10:21) (97% - 100%)    Parameters below as of 18 Nov 2022 10:21  Patient On (Oxygen Delivery Method): room air      Daily     Daily   I&O's Summary    17 Nov 2022 07:01  -  18 Nov 2022 07:00  --------------------------------------------------------  IN: 375 mL / OUT: 0 mL / NET: 375 mL    18 Nov 2022 07:01  -  18 Nov 2022 14:05  --------------------------------------------------------  IN: 236 mL / OUT: 0 mL / NET: 236 mL      CAPILLARY BLOOD GLUCOSE        PHYSICAL EXAM:  GENERAL: NAD, confused, disheveled   HEAD:  Atraumatic, Normocephalic  EYES: EOMI, PERRLA, conjunctiva and sclera clear  ENMT: No tonsillar erythema, exudates, or enlargement; Moist mucous membranes, Good dentition, No lesions  NECK: Supple, No JVD, Normal thyroid  NERVOUS SYSTEM:  Alert & Oriented X2, Poor concentration; Motor Strength 5/5 B/L upper and lower extremities; DTRs 2+ intact and symmetric  CHEST/LUNG: Clear to percussion bilaterally; No rales, rhonchi, wheezing, or rubs  HEART: Regular rate and rhythm; No murmurs, rubs, or gallops  ABDOMEN: Soft, Nontender, Nondistended; Bowel sounds present  EXTREMITIES:  2+ Peripheral Pulses, No clubbing, cyanosis, or edema  LYMPH: No lymphadenopathy noted  SKIN: No rashes or lesions    Labs                          14.4   7.56  )-----------( 186      ( 17 Nov 2022 05:40 )             39.3     11-18    137  |  106  |  5<L>  ----------------------------<  131<H>  3.3<L>   |  22  |  0.92    Ca    9.7      18 Nov 2022 07:20  Phos  2.3     11-18  Mg     2.0     11-18    TPro  7.2  /  Alb  3.4  /  TBili  1.4<H>  /  DBili  x   /  AST  57<H>  /  ALT  47  /  AlkPhos  115  11-17                            Radiology and Imaging reviewed.

## 2022-11-19 VITALS
OXYGEN SATURATION: 98 % | TEMPERATURE: 98 F | DIASTOLIC BLOOD PRESSURE: 69 MMHG | HEART RATE: 94 BPM | SYSTOLIC BLOOD PRESSURE: 108 MMHG | RESPIRATION RATE: 17 BRPM

## 2022-11-19 LAB
ALBUMIN SERPL ELPH-MCNC: 3.1 G/DL — LOW (ref 3.3–5)
ALP SERPL-CCNC: 112 U/L — SIGNIFICANT CHANGE UP (ref 40–120)
ALT FLD-CCNC: 36 U/L — SIGNIFICANT CHANGE UP (ref 12–78)
ANION GAP SERPL CALC-SCNC: 8 MMOL/L — SIGNIFICANT CHANGE UP (ref 5–17)
AST SERPL-CCNC: 32 U/L — SIGNIFICANT CHANGE UP (ref 15–37)
BILIRUB SERPL-MCNC: 0.9 MG/DL — SIGNIFICANT CHANGE UP (ref 0.2–1.2)
BUN SERPL-MCNC: 9 MG/DL — SIGNIFICANT CHANGE UP (ref 7–23)
CALCIUM SERPL-MCNC: 8.8 MG/DL — SIGNIFICANT CHANGE UP (ref 8.5–10.1)
CHLORIDE SERPL-SCNC: 108 MMOL/L — SIGNIFICANT CHANGE UP (ref 96–108)
CO2 SERPL-SCNC: 24 MMOL/L — SIGNIFICANT CHANGE UP (ref 22–31)
CREAT SERPL-MCNC: 0.95 MG/DL — SIGNIFICANT CHANGE UP (ref 0.5–1.3)
EGFR: 102 ML/MIN/1.73M2 — SIGNIFICANT CHANGE UP
GLUCOSE SERPL-MCNC: 120 MG/DL — HIGH (ref 70–99)
HCT VFR BLD CALC: 37.1 % — LOW (ref 39–50)
HGB BLD-MCNC: 13 G/DL — SIGNIFICANT CHANGE UP (ref 13–17)
MAGNESIUM SERPL-MCNC: 1.9 MG/DL — SIGNIFICANT CHANGE UP (ref 1.6–2.6)
MCHC RBC-ENTMCNC: 29.5 PG — SIGNIFICANT CHANGE UP (ref 27–34)
MCHC RBC-ENTMCNC: 35 G/DL — SIGNIFICANT CHANGE UP (ref 32–36)
MCV RBC AUTO: 84.1 FL — SIGNIFICANT CHANGE UP (ref 80–100)
NRBC # BLD: 0 /100 WBCS — SIGNIFICANT CHANGE UP (ref 0–0)
PHOSPHATE SERPL-MCNC: 3.4 MG/DL — SIGNIFICANT CHANGE UP (ref 2.5–4.5)
PLATELET # BLD AUTO: 173 K/UL — SIGNIFICANT CHANGE UP (ref 150–400)
POTASSIUM SERPL-MCNC: 3.5 MMOL/L — SIGNIFICANT CHANGE UP (ref 3.5–5.3)
POTASSIUM SERPL-SCNC: 3.5 MMOL/L — SIGNIFICANT CHANGE UP (ref 3.5–5.3)
PROT SERPL-MCNC: 6.8 GM/DL — SIGNIFICANT CHANGE UP (ref 6–8.3)
RBC # BLD: 4.41 M/UL — SIGNIFICANT CHANGE UP (ref 4.2–5.8)
RBC # FLD: 12.2 % — SIGNIFICANT CHANGE UP (ref 10.3–14.5)
SODIUM SERPL-SCNC: 140 MMOL/L — SIGNIFICANT CHANGE UP (ref 135–145)
WBC # BLD: 9.1 K/UL — SIGNIFICANT CHANGE UP (ref 3.8–10.5)
WBC # FLD AUTO: 9.1 K/UL — SIGNIFICANT CHANGE UP (ref 3.8–10.5)

## 2022-11-19 PROCEDURE — 99239 HOSP IP/OBS DSCHRG MGMT >30: CPT

## 2022-11-19 RX ADMIN — ENOXAPARIN SODIUM 40 MILLIGRAM(S): 100 INJECTION SUBCUTANEOUS at 06:36

## 2022-11-19 RX ADMIN — Medication 2 MILLIGRAM(S): at 08:13

## 2022-11-19 RX ADMIN — PANTOPRAZOLE SODIUM 40 MILLIGRAM(S): 20 TABLET, DELAYED RELEASE ORAL at 06:36

## 2022-11-19 RX ADMIN — Medication 2 MILLIGRAM(S): at 02:49

## 2022-11-19 RX ADMIN — Medication 1 PACKET(S): at 06:35

## 2022-11-19 RX ADMIN — Medication 12.5 MILLIGRAM(S): at 08:13

## 2022-11-19 RX ADMIN — Medication 1 TABLET(S): at 12:01

## 2022-11-19 RX ADMIN — AMLODIPINE BESYLATE 10 MILLIGRAM(S): 2.5 TABLET ORAL at 06:36

## 2022-11-19 RX ADMIN — Medication 1 MILLIGRAM(S): at 12:01

## 2022-11-19 RX ADMIN — Medication 100 MILLIGRAM(S): at 12:01

## 2022-11-19 NOTE — DISCHARGE NOTE PROVIDER - ATTENDING DISCHARGE PHYSICAL EXAMINATION:
GENERAL: NAD, confused, disheveled   HEAD:  Atraumatic, Normocephalic  EYES: EOMI, PERRLA, conjunctiva and sclera clear  ENMT: No tonsillar erythema, exudates, or enlargement; Moist mucous membranes, Good dentition, No lesions  NECK: Supple, No JVD, Normal thyroid  NERVOUS SYSTEM:  Alert & Oriented X2, Poor concentration; Motor Strength 5/5 B/L upper and lower extremities; DTRs 2+ intact and symmetric  CHEST/LUNG: Clear to percussion bilaterally; No rales, rhonchi, wheezing, or rubs  HEART: Regular rate and rhythm; No murmurs, rubs, or gallops  ABDOMEN: Soft, Nontender, Nondistended; Bowel sounds present  EXTREMITIES:  2+ Peripheral Pulses, No clubbing, cyanosis, or edema  LYMPH: No lymphadenopathy noted  SKIN: No rashes or lesions

## 2022-11-19 NOTE — DISCHARGE NOTE PROVIDER - NSDCCPCAREPLAN_GEN_ALL_CORE_FT
PRINCIPAL DISCHARGE DIAGNOSIS  Diagnosis: Alcohol withdrawal  Assessment and Plan of Treatment:       SECONDARY DISCHARGE DIAGNOSES  Diagnosis: Pancreatitis  Assessment and Plan of Treatment:

## 2022-11-19 NOTE — DISCHARGE NOTE NURSING/CASE MANAGEMENT/SOCIAL WORK - NSDCPEFALRISK_GEN_ALL_CORE
For information on Fall & Injury Prevention, visit: [email or web address] OR  [email or web address] OR  https://www.cdc.gov/steadi/patient.html

## 2022-11-19 NOTE — DISCHARGE NOTE PROVIDER - HOSPITAL COURSE
NP Progress Note     HPI:  Mr. Browne is 42 y.o M with pmh of alcohol abuse, prior admission for alcohol withdrawal, alcoholic gastritis Georgina Evelin syndrom, HTN presented from home with 24 hr of epigastric pain.   Hpi obtained from, although he is poor hx due to being under effect of alcohol, he reports that he has been having on and off shakes and morning epigastric abdominal pain a/w nausea and occasional NBNB vomitus on and off over last week, which would get better with alcohol intake, however last few days epigastric pain is worse and today he could not tolerate PO intake which brought him to ED. He denied any SOB, CP, changes in BM, dysuria, fever, chills, cough.   In ED, vs with initial bp 166/122 which improved to 134/96 with pain medication and librium, work up remarkable for lipase 1.5K, LA 2.9, normal LFTs, alcohol level 358, CT abd/pelvis with peripancreatic and duodenum  inflammation, ciwa score 8 on presentation. Pt was given total 150 mg of librium,  4 mg ativan in  ED and admitted for further management of alcohol withdrawal and pancreatitis     Current med list as per pt  EMR was reviewed (15 Nov 2022 20:59)        Subjective/Observations: Pt. seen and examined and evaluated. Pt. resting comfortably in bed in NAD, with no respiratory distress, no chest pain, dyspnea, palpitations, PND, or orthopnea.    REVIEW OF SYSTEMS: All other review of systems is negative unless indicated above    PAST MEDICAL & SURGICAL HISTORY:  HTN (hypertension)  Tachycardia  No significant past surgical history    MEDICATIONS  (STANDING):  amLODIPine   Tablet 10 milliGRAM(s) Oral daily  atorvastatin 40 milliGRAM(s) Oral at bedtime  enoxaparin Injectable 40 milliGRAM(s) SubCutaneous every 24 hours  folic acid 1 milliGRAM(s) Oral daily  loperamide 2 milliGRAM(s) Oral two times a day  metoprolol tartrate 12.5 milliGRAM(s) Oral two times a day  multivitamin 1 Tablet(s) Oral daily  pantoprazole    Tablet 40 milliGRAM(s) Oral before breakfast  thiamine 100 milliGRAM(s) Oral daily    MEDICATIONS  (PRN):  acetaminophen     Tablet .. 650 milliGRAM(s) Oral every 6 hours PRN Temp greater or equal to 38C (100.4F), Mild Pain (1 - 3)  aluminum hydroxide/magnesium hydroxide/simethicone Suspension 30 milliLiter(s) Oral every 4 hours PRN Dyspepsia  LORazepam   Injectable 2 milliGRAM(s) IV Push every 1 hour PRN CIWA-Ar score 8 or greater  melatonin 3 milliGRAM(s) Oral at bedtime PRN Insomnia  morphine  - Injectable 2 milliGRAM(s) IV Push every 4 hours PRN Severe Pain (7 - 10)  morphine  - Injectable 1 milliGRAM(s) IV Push every 4 hours PRN Moderate Pain (4 - 6)  ondansetron Injectable 4 milliGRAM(s) IV Push every 8 hours PRN Nausea and/or Vomiting      Allergies: No Known Allergies    Vital Signs Last 24 Hrs  T(C): 36.5 (19 Nov 2022 10:19), Max: 37.1 (18 Nov 2022 17:22)  T(F): 97.7 (19 Nov 2022 10:19), Max: 98.7 (18 Nov 2022 17:22)  HR: 94 (19 Nov 2022 10:19) (88 - 103)  BP: 108/69 (19 Nov 2022 10:19) (108/69 - 128/89)  BP(mean): --  RR: 17 (19 Nov 2022 10:19) (17 - 18)  SpO2: 98% (19 Nov 2022 10:19) (98% - 99%)    Parameters below as of 19 Nov 2022 10:19  Patient On (Oxygen Delivery Method): room air      I&O's Summary    18 Nov 2022 07:01  -  19 Nov 2022 07:00  --------------------------------------------------------  IN: 236 mL / OUT: 0 mL / NET: 236 mL    19 Nov 2022 07:01  -  19 Nov 2022 12:20  --------------------------------------------------------  IN: 400 mL / OUT: 0 mL / NET: 400 mL           Physical Exam:  Appearance: [ ] Normal  [ ] abnormal [ X] NAD   Eyes: [ ] PERRL [ ] EOMI  HEENT: [ ] Normal [ ] Abnormal oral mucosa [ ]NC/AT  Cardiovascular: [ X] S1 [X ] S2 [ ] RRR [ ] m/r/g [ ]edema [ ] JVP  Procedural Access Site: [ ]  hematoma [ ] tender to palpation [ ] 2+ pulse [ ] bruit [ ] Ecchymosis  Respiratory: [X ] Clear to auscultation bilaterally  Gastrointestinal: [X] Soft [ ] tenderness[ ] distension [ ] BS  Musculoskeletal: [ ] clubbing [ ] joint deformity   Neurologic: [ ] Non-focal  Lymphatic: [ ] lymphadenopathy  Psychiatry: [ X] AAOx3  [ ] confused [ ] disoriented [ ] Mood & affect appropriate  Skin: [ ]  rashes [ ] ecchymoses [ ] cyanosis

## 2022-11-23 DIAGNOSIS — K85.20 ALCOHOL INDUCED ACUTE PANCREATITIS WITHOUT NECROSIS OR INFECTION: ICD-10-CM

## 2022-11-23 DIAGNOSIS — F10.139 ALCOHOL ABUSE WITH WITHDRAWAL, UNSPECIFIED: ICD-10-CM

## 2022-11-23 DIAGNOSIS — I10 ESSENTIAL (PRIMARY) HYPERTENSION: ICD-10-CM

## 2022-11-23 DIAGNOSIS — K76.0 FATTY (CHANGE OF) LIVER, NOT ELSEWHERE CLASSIFIED: ICD-10-CM

## 2022-11-23 DIAGNOSIS — F10.129 ALCOHOL ABUSE WITH INTOXICATION, UNSPECIFIED: ICD-10-CM
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
## 2022-12-09 ENCOUNTER — EMERGENCY (EMERGENCY)
Facility: HOSPITAL | Age: 42
LOS: 0 days | Discharge: HOME | End: 2022-12-10
Attending: STUDENT IN AN ORGANIZED HEALTH CARE EDUCATION/TRAINING PROGRAM

## 2022-12-09 DIAGNOSIS — F11.129 OPIOID ABUSE WITH INTOXICATION, UNSPECIFIED: ICD-10-CM

## 2022-12-09 DIAGNOSIS — F10.129 ALCOHOL ABUSE WITH INTOXICATION, UNSPECIFIED: ICD-10-CM

## 2022-12-09 DIAGNOSIS — R47.81 SLURRED SPEECH: ICD-10-CM

## 2022-12-09 DIAGNOSIS — Z20.822 CONTACT WITH AND (SUSPECTED) EXPOSURE TO COVID-19: ICD-10-CM

## 2022-12-09 DIAGNOSIS — I10 ESSENTIAL (PRIMARY) HYPERTENSION: ICD-10-CM

## 2022-12-09 DIAGNOSIS — R00.0 TACHYCARDIA, UNSPECIFIED: ICD-10-CM

## 2022-12-09 PROCEDURE — 99285 EMERGENCY DEPT VISIT HI MDM: CPT

## 2022-12-10 VITALS
RESPIRATION RATE: 19 BRPM | TEMPERATURE: 98 F | SYSTOLIC BLOOD PRESSURE: 109 MMHG | OXYGEN SATURATION: 99 % | HEART RATE: 86 BPM | DIASTOLIC BLOOD PRESSURE: 67 MMHG

## 2022-12-10 VITALS
OXYGEN SATURATION: 94 % | WEIGHT: 173.06 LBS | RESPIRATION RATE: 18 BRPM | SYSTOLIC BLOOD PRESSURE: 118 MMHG | HEART RATE: 113 BPM | DIASTOLIC BLOOD PRESSURE: 79 MMHG | HEIGHT: 67 IN | TEMPERATURE: 98 F

## 2022-12-10 LAB
ALBUMIN SERPL ELPH-MCNC: 3.7 G/DL — SIGNIFICANT CHANGE UP (ref 3.3–5)
ALP SERPL-CCNC: 107 U/L — SIGNIFICANT CHANGE UP (ref 40–120)
ALT FLD-CCNC: 30 U/L — SIGNIFICANT CHANGE UP (ref 12–78)
AMPHET UR-MCNC: NEGATIVE — SIGNIFICANT CHANGE UP
ANION GAP SERPL CALC-SCNC: 11 MMOL/L — SIGNIFICANT CHANGE UP (ref 5–17)
APPEARANCE UR: CLEAR — SIGNIFICANT CHANGE UP
APTT BLD: 30.8 SEC — SIGNIFICANT CHANGE UP (ref 27.5–35.5)
AST SERPL-CCNC: 19 U/L — SIGNIFICANT CHANGE UP (ref 15–37)
BARBITURATES UR SCN-MCNC: NEGATIVE — SIGNIFICANT CHANGE UP
BASOPHILS # BLD AUTO: 0.07 K/UL — SIGNIFICANT CHANGE UP (ref 0–0.2)
BASOPHILS NFR BLD AUTO: 0.6 % — SIGNIFICANT CHANGE UP (ref 0–2)
BENZODIAZ UR-MCNC: POSITIVE — SIGNIFICANT CHANGE UP
BILIRUB SERPL-MCNC: 0.3 MG/DL — SIGNIFICANT CHANGE UP (ref 0.2–1.2)
BILIRUB UR-MCNC: NEGATIVE — SIGNIFICANT CHANGE UP
BUN SERPL-MCNC: 14 MG/DL — SIGNIFICANT CHANGE UP (ref 7–23)
CALCIUM SERPL-MCNC: 9.1 MG/DL — SIGNIFICANT CHANGE UP (ref 8.5–10.1)
CHLORIDE SERPL-SCNC: 105 MMOL/L — SIGNIFICANT CHANGE UP (ref 96–108)
CO2 SERPL-SCNC: 19 MMOL/L — LOW (ref 22–31)
COCAINE METAB.OTHER UR-MCNC: NEGATIVE — SIGNIFICANT CHANGE UP
COLOR SPEC: YELLOW — SIGNIFICANT CHANGE UP
CREAT SERPL-MCNC: 1.15 MG/DL — SIGNIFICANT CHANGE UP (ref 0.5–1.3)
DIFF PNL FLD: NEGATIVE — SIGNIFICANT CHANGE UP
EGFR: 81 ML/MIN/1.73M2 — SIGNIFICANT CHANGE UP
EOSINOPHIL # BLD AUTO: 0.17 K/UL — SIGNIFICANT CHANGE UP (ref 0–0.5)
EOSINOPHIL NFR BLD AUTO: 1.5 % — SIGNIFICANT CHANGE UP (ref 0–6)
ETHANOL SERPL-MCNC: 348 MG/DL — HIGH (ref 0–10)
FLUAV AG NPH QL: SIGNIFICANT CHANGE UP
FLUBV AG NPH QL: SIGNIFICANT CHANGE UP
GLUCOSE BLDC GLUCOMTR-MCNC: 152 MG/DL — HIGH (ref 70–99)
GLUCOSE SERPL-MCNC: 180 MG/DL — HIGH (ref 70–99)
GLUCOSE UR QL: 1000 MG/DL
HCT VFR BLD CALC: 39.3 % — SIGNIFICANT CHANGE UP (ref 39–50)
HGB BLD-MCNC: 13.4 G/DL — SIGNIFICANT CHANGE UP (ref 13–17)
HYALINE CASTS # UR AUTO: ABNORMAL /LPF
IMM GRANULOCYTES NFR BLD AUTO: 1.2 % — HIGH (ref 0–0.9)
INR BLD: 0.94 RATIO — SIGNIFICANT CHANGE UP (ref 0.88–1.16)
KETONES UR-MCNC: NEGATIVE — SIGNIFICANT CHANGE UP
LACTATE SERPL-SCNC: 2.1 MMOL/L — HIGH (ref 0.7–2)
LACTATE SERPL-SCNC: 3.1 MMOL/L — HIGH (ref 0.7–2)
LEUKOCYTE ESTERASE UR-ACNC: NEGATIVE — SIGNIFICANT CHANGE UP
LYMPHOCYTES # BLD AUTO: 2.42 K/UL — SIGNIFICANT CHANGE UP (ref 1–3.3)
LYMPHOCYTES # BLD AUTO: 21.6 % — SIGNIFICANT CHANGE UP (ref 13–44)
MAGNESIUM SERPL-MCNC: 2.4 MG/DL — SIGNIFICANT CHANGE UP (ref 1.6–2.6)
MCHC RBC-ENTMCNC: 29.2 PG — SIGNIFICANT CHANGE UP (ref 27–34)
MCHC RBC-ENTMCNC: 34.1 G/DL — SIGNIFICANT CHANGE UP (ref 32–36)
MCV RBC AUTO: 85.6 FL — SIGNIFICANT CHANGE UP (ref 80–100)
METHADONE UR-MCNC: NEGATIVE — SIGNIFICANT CHANGE UP
MONOCYTES # BLD AUTO: 1.26 K/UL — HIGH (ref 0–0.9)
MONOCYTES NFR BLD AUTO: 11.2 % — SIGNIFICANT CHANGE UP (ref 2–14)
NEUTROPHILS # BLD AUTO: 7.17 K/UL — SIGNIFICANT CHANGE UP (ref 1.8–7.4)
NEUTROPHILS NFR BLD AUTO: 63.9 % — SIGNIFICANT CHANGE UP (ref 43–77)
NITRITE UR-MCNC: NEGATIVE — SIGNIFICANT CHANGE UP
NRBC # BLD: 0 /100 WBCS — SIGNIFICANT CHANGE UP (ref 0–0)
OPIATES UR-MCNC: POSITIVE — SIGNIFICANT CHANGE UP
PCP SPEC-MCNC: SIGNIFICANT CHANGE UP
PCP UR-MCNC: NEGATIVE — SIGNIFICANT CHANGE UP
PH UR: 6.5 — SIGNIFICANT CHANGE UP (ref 5–8)
PHOSPHATE SERPL-MCNC: 2.8 MG/DL — SIGNIFICANT CHANGE UP (ref 2.5–4.5)
PLATELET # BLD AUTO: 339 K/UL — SIGNIFICANT CHANGE UP (ref 150–400)
POTASSIUM SERPL-MCNC: 3.6 MMOL/L — SIGNIFICANT CHANGE UP (ref 3.5–5.3)
POTASSIUM SERPL-SCNC: 3.6 MMOL/L — SIGNIFICANT CHANGE UP (ref 3.5–5.3)
PROT SERPL-MCNC: 7.7 GM/DL — SIGNIFICANT CHANGE UP (ref 6–8.3)
PROT UR-MCNC: 30 MG/DL
PROTHROM AB SERPL-ACNC: 11.3 SEC — SIGNIFICANT CHANGE UP (ref 10.5–13.4)
RBC # BLD: 4.59 M/UL — SIGNIFICANT CHANGE UP (ref 4.2–5.8)
RBC # FLD: 12.5 % — SIGNIFICANT CHANGE UP (ref 10.3–14.5)
RBC CASTS # UR COMP ASSIST: SIGNIFICANT CHANGE UP /HPF (ref 0–4)
SARS-COV-2 RNA SPEC QL NAA+PROBE: SIGNIFICANT CHANGE UP
SODIUM SERPL-SCNC: 135 MMOL/L — SIGNIFICANT CHANGE UP (ref 135–145)
SP GR SPEC: 1.01 — SIGNIFICANT CHANGE UP (ref 1.01–1.02)
THC UR QL: NEGATIVE — SIGNIFICANT CHANGE UP
TROPONIN I, HIGH SENSITIVITY RESULT: 10.6 NG/L — SIGNIFICANT CHANGE UP
UROBILINOGEN FLD QL: NEGATIVE MG/DL — SIGNIFICANT CHANGE UP
WBC # BLD: 11.22 K/UL — HIGH (ref 3.8–10.5)
WBC # FLD AUTO: 11.22 K/UL — HIGH (ref 3.8–10.5)
WBC UR QL: SIGNIFICANT CHANGE UP

## 2022-12-10 PROCEDURE — 71045 X-RAY EXAM CHEST 1 VIEW: CPT | Mod: 26

## 2022-12-10 PROCEDURE — 93010 ELECTROCARDIOGRAM REPORT: CPT

## 2022-12-10 PROCEDURE — 70450 CT HEAD/BRAIN W/O DYE: CPT | Mod: 26,MA

## 2022-12-10 RX ORDER — SODIUM CHLORIDE 9 MG/ML
1000 INJECTION INTRAMUSCULAR; INTRAVENOUS; SUBCUTANEOUS ONCE
Refills: 0 | Status: COMPLETED | OUTPATIENT
Start: 2022-12-10 | End: 2022-12-10

## 2022-12-10 RX ADMIN — SODIUM CHLORIDE 1000 MILLILITER(S): 9 INJECTION INTRAMUSCULAR; INTRAVENOUS; SUBCUTANEOUS at 01:44

## 2022-12-10 NOTE — ED ADULT NURSE NOTE - ED STAT RN HANDOFF DETAILS
Pt cleared to dc by Dr Gay. Dc instructions explained pt and wife at bedside verbalized full understanding. Pt left ambulatory with steady gait. VSS

## 2022-12-10 NOTE — ED ADULT NURSE NOTE - OBJECTIVE STATEMENT
Pt aaox3. Pt s/p syncope. As per wife. pt found at the floor. Pt denies sob/n/v or any discomfort. Pt aaox3. Pt s/p syncope. As per wife. pt found at the floor. Pt denies pain, sob/n/v or any discomfort.

## 2022-12-10 NOTE — ED PROVIDER NOTE - PHYSICAL EXAMINATION
GEN: Awake, alert, interactive, NAD.  HEAD AND NECK: NC/AT. Airway patent. Neck supple.   EYES:  Clear b/l. EOMI. PERRL.   ENT: Moist mucus membranes.   CARDIAC: Regular rate, regular rhythm. No evident pedal edema.    RESP/CHEST: Normal respiratory effort with no use of accessory muscles or retractions. Clear throughout on auscultation.  ABD: Soft, non-distended, non-tender. No rebound, no guarding.   BACK: No midline spinal TTP. No CVAT.   EXTREMITIES: Moving all extremities with no apparent deformities.   SKIN: Warm, dry, intact normal color. No rash.   NEURO: AOx3, CN II-XII grossly intact, no focal deficits. + slurred speech.   PSYCH: Appropriate mood and affect. GEN: Awake, alert, interactive, NAD.  HEAD AND NECK: NC/AT. Airway patent. Neck supple.   EYES: + b/l conjunctival injection. EOMI. PERRL.   ENT: Moist mucus membranes.   CARDIAC: Regular rate, regular rhythm. No evident pedal edema.    RESP/CHEST: Normal respiratory effort with no use of accessory muscles or retractions. Clear throughout on auscultation.  ABD: Soft, non-distended, non-tender. No rebound, no guarding.   BACK: No midline spinal TTP. No CVAT.   EXTREMITIES: Moving all extremities with no apparent deformities.   SKIN: Warm, dry, intact normal color. No rash.   NEURO: AOx3, CN II-XII grossly intact, no focal deficits. + slurred speech.   PSYCH: Appropriate mood and affect.

## 2022-12-10 NOTE — ED PROVIDER NOTE - PROGRESS NOTE DETAILS
Clinically sober, feels better, w/u neg except , + benzos / opiates. Pt w/ PCP appt tmrw AM. Wife will drive. W/u significant for: DEMETRIA + 348, Utox + opiates / benzos. Lactate 3.1 > 2.1. On re-eval, pt clinically sober, states he is feeling better. Wife will drive home. Stable for d/c home. Pt w/ PCP appt scheduled for tmrw. Wife requesting EtOH detox program information, provided. Return signs / symptoms d/w pt, wife. They understand / agree w/ this plan.

## 2022-12-10 NOTE — ED PROVIDER NOTE - CLINICAL SUMMARY MEDICAL DECISION MAKING FREE TEXT BOX
42M BIBEMS d/t fall OOB, slurred speech. AF, VSS. FS WNL. NIH 0. Plan: CT brain, ECG, trop, CXR, CBC, CMP, mag / phos, coags, DEMETRIA, Utox. 42M HTN, tachycardia BIBEMS d/t fall OOB, slurred speech. AF, VSS. FS WNL. NIH 0. Plan: CT brain, ECG, trop, lactate, CXR, CBC, CMP, mag / phos, coags, DEMETRIA, Utox. 42M HTN, tachycardia BIBEMS d/t fall OOB, slurred speech. AF, VSS. FS WNL. NIH 0. Plan: CT brain, ECG, trop, lactate, CXR, CBC, CMP, mag / phos, coags, DEMETRIA, Utox. Give IVF. Re-eval.

## 2022-12-10 NOTE — ED PROVIDER NOTE - PATIENT PORTAL LINK FT
You can access the FollowMyHealth Patient Portal offered by [facility] by registering at the following website: [email or web address]. By joining [email or web address]’s FollowMyHealth portal, you will also be able to view your health information using other applications (apps) compatible with our system.

## 2022-12-10 NOTE — ED PROVIDER NOTE - OBJECTIVE STATEMENT
42M PMH HTN, tachycardia BIBEMS accompanied by wife d/t fall OOB, slurred speech. Per wife, pt worked double shift, they went to sleep at 9P, found pt laying next to bed at 1130P. Wife states pt often w/ similar slurred speech d/t lack of sleep. NIH 0. FS WNL. Pt w/o complaints. Pt denies EtOH use tonight.     PMH as above, PSH none, NKDA, no meds. 42M PMH HTN, tachycardia BIBEMS accompanied by wife d/t fall OOB, slurred speech. Per wife, pt worked double shift, pt and wife went to sleep at 9P, wife found pt laying next to bed at 1130P. Wife states pt often w/ similar slurred speech d/t lack of sleep. Per EMS, presumed EtOH intox, pt denies EtOH use last night. FS WNL. NIH 0. Pt w/o complaints.     PMH as above, PSH none, NKDA, no meds.

## 2023-04-23 ENCOUNTER — EMERGENCY (EMERGENCY)
Facility: HOSPITAL | Age: 43
LOS: 0 days | Discharge: HOME | End: 2023-04-23
Attending: EMERGENCY MEDICINE
Payer: COMMERCIAL

## 2023-04-23 VITALS
RESPIRATION RATE: 18 BRPM | WEIGHT: 160.94 LBS | HEIGHT: 78 IN | SYSTOLIC BLOOD PRESSURE: 123 MMHG | TEMPERATURE: 98 F | HEART RATE: 98 BPM | OXYGEN SATURATION: 97 % | DIASTOLIC BLOOD PRESSURE: 89 MMHG

## 2023-04-23 VITALS
RESPIRATION RATE: 17 BRPM | TEMPERATURE: 98 F | HEART RATE: 88 BPM | DIASTOLIC BLOOD PRESSURE: 97 MMHG | SYSTOLIC BLOOD PRESSURE: 130 MMHG | OXYGEN SATURATION: 98 %

## 2023-04-23 DIAGNOSIS — R07.89 OTHER CHEST PAIN: ICD-10-CM

## 2023-04-23 DIAGNOSIS — R10.13 EPIGASTRIC PAIN: ICD-10-CM

## 2023-04-23 DIAGNOSIS — I10 ESSENTIAL (PRIMARY) HYPERTENSION: ICD-10-CM

## 2023-04-23 DIAGNOSIS — R11.0 NAUSEA: ICD-10-CM

## 2023-04-23 DIAGNOSIS — F10.90 ALCOHOL USE, UNSPECIFIED, UNCOMPLICATED: ICD-10-CM

## 2023-04-23 DIAGNOSIS — K85.90 ACUTE PANCREATITIS WITHOUT NECROSIS OR INFECTION, UNSPECIFIED: ICD-10-CM

## 2023-04-23 LAB
ALBUMIN SERPL ELPH-MCNC: 3.7 G/DL — SIGNIFICANT CHANGE UP (ref 3.3–5)
ALP SERPL-CCNC: 92 U/L — SIGNIFICANT CHANGE UP (ref 40–120)
ALT FLD-CCNC: 44 U/L — SIGNIFICANT CHANGE UP (ref 12–78)
ANION GAP SERPL CALC-SCNC: 3 MMOL/L — LOW (ref 5–17)
APTT BLD: 28.4 SEC — SIGNIFICANT CHANGE UP (ref 27.5–35.5)
AST SERPL-CCNC: 44 U/L — HIGH (ref 15–37)
BASOPHILS # BLD AUTO: 0.05 K/UL — SIGNIFICANT CHANGE UP (ref 0–0.2)
BASOPHILS NFR BLD AUTO: 0.9 % — SIGNIFICANT CHANGE UP (ref 0–2)
BILIRUB SERPL-MCNC: 0.9 MG/DL — SIGNIFICANT CHANGE UP (ref 0.2–1.2)
BUN SERPL-MCNC: 14 MG/DL — SIGNIFICANT CHANGE UP (ref 7–23)
CALCIUM SERPL-MCNC: 8.7 MG/DL — SIGNIFICANT CHANGE UP (ref 8.5–10.1)
CHLORIDE SERPL-SCNC: 107 MMOL/L — SIGNIFICANT CHANGE UP (ref 96–108)
CO2 SERPL-SCNC: 29 MMOL/L — SIGNIFICANT CHANGE UP (ref 22–31)
CREAT SERPL-MCNC: 1.17 MG/DL — SIGNIFICANT CHANGE UP (ref 0.5–1.3)
EGFR: 79 ML/MIN/1.73M2 — SIGNIFICANT CHANGE UP
EOSINOPHIL # BLD AUTO: 0.07 K/UL — SIGNIFICANT CHANGE UP (ref 0–0.5)
EOSINOPHIL NFR BLD AUTO: 1.2 % — SIGNIFICANT CHANGE UP (ref 0–6)
ETHANOL SERPL-MCNC: 217 MG/DL — HIGH (ref 0–10)
GLUCOSE SERPL-MCNC: 146 MG/DL — HIGH (ref 70–99)
HCT VFR BLD CALC: 47.6 % — SIGNIFICANT CHANGE UP (ref 39–50)
HGB BLD-MCNC: 16.3 G/DL — SIGNIFICANT CHANGE UP (ref 13–17)
IMM GRANULOCYTES NFR BLD AUTO: 0.5 % — SIGNIFICANT CHANGE UP (ref 0–0.9)
INR BLD: 1.17 RATIO — HIGH (ref 0.88–1.16)
LIDOCAIN IGE QN: 1055 U/L — HIGH (ref 73–393)
LYMPHOCYTES # BLD AUTO: 2.74 K/UL — SIGNIFICANT CHANGE UP (ref 1–3.3)
LYMPHOCYTES # BLD AUTO: 47.2 % — HIGH (ref 13–44)
MAGNESIUM SERPL-MCNC: 2.3 MG/DL — SIGNIFICANT CHANGE UP (ref 1.6–2.6)
MCHC RBC-ENTMCNC: 27.7 PG — SIGNIFICANT CHANGE UP (ref 27–34)
MCHC RBC-ENTMCNC: 34.2 G/DL — SIGNIFICANT CHANGE UP (ref 32–36)
MCV RBC AUTO: 81 FL — SIGNIFICANT CHANGE UP (ref 80–100)
MONOCYTES # BLD AUTO: 0.48 K/UL — SIGNIFICANT CHANGE UP (ref 0–0.9)
MONOCYTES NFR BLD AUTO: 8.3 % — SIGNIFICANT CHANGE UP (ref 2–14)
NEUTROPHILS # BLD AUTO: 2.43 K/UL — SIGNIFICANT CHANGE UP (ref 1.8–7.4)
NEUTROPHILS NFR BLD AUTO: 41.9 % — LOW (ref 43–77)
NRBC # BLD: 0 /100 WBCS — SIGNIFICANT CHANGE UP (ref 0–0)
PLATELET # BLD AUTO: 278 K/UL — SIGNIFICANT CHANGE UP (ref 150–400)
POTASSIUM SERPL-MCNC: 4.2 MMOL/L — SIGNIFICANT CHANGE UP (ref 3.5–5.3)
POTASSIUM SERPL-SCNC: 4.2 MMOL/L — SIGNIFICANT CHANGE UP (ref 3.5–5.3)
PROT SERPL-MCNC: 7.9 GM/DL — SIGNIFICANT CHANGE UP (ref 6–8.3)
PROTHROM AB SERPL-ACNC: 14.1 SEC — HIGH (ref 10.5–13.4)
RBC # BLD: 5.88 M/UL — HIGH (ref 4.2–5.8)
RBC # FLD: 12.1 % — SIGNIFICANT CHANGE UP (ref 10.3–14.5)
SODIUM SERPL-SCNC: 139 MMOL/L — SIGNIFICANT CHANGE UP (ref 135–145)
WBC # BLD: 5.8 K/UL — SIGNIFICANT CHANGE UP (ref 3.8–10.5)
WBC # FLD AUTO: 5.8 K/UL — SIGNIFICANT CHANGE UP (ref 3.8–10.5)

## 2023-04-23 PROCEDURE — 71045 X-RAY EXAM CHEST 1 VIEW: CPT | Mod: 26

## 2023-04-23 PROCEDURE — 93010 ELECTROCARDIOGRAM REPORT: CPT

## 2023-04-23 PROCEDURE — 74177 CT ABD & PELVIS W/CONTRAST: CPT | Mod: 26,MA

## 2023-04-23 PROCEDURE — 99285 EMERGENCY DEPT VISIT HI MDM: CPT

## 2023-04-23 RX ORDER — ONDANSETRON 8 MG/1
1 TABLET, FILM COATED ORAL
Qty: 8 | Refills: 0
Start: 2023-04-23 | End: 2023-04-30

## 2023-04-23 RX ORDER — OXYCODONE AND ACETAMINOPHEN 5; 325 MG/1; MG/1
1 TABLET ORAL
Qty: 8 | Refills: 0
Start: 2023-04-23 | End: 2023-04-26

## 2023-04-23 RX ORDER — PANTOPRAZOLE SODIUM 20 MG/1
40 TABLET, DELAYED RELEASE ORAL ONCE
Refills: 0 | Status: COMPLETED | OUTPATIENT
Start: 2023-04-23 | End: 2023-04-23

## 2023-04-23 RX ORDER — SODIUM CHLORIDE 9 MG/ML
1000 INJECTION INTRAMUSCULAR; INTRAVENOUS; SUBCUTANEOUS ONCE
Refills: 0 | Status: COMPLETED | OUTPATIENT
Start: 2023-04-23 | End: 2023-04-23

## 2023-04-23 RX ORDER — ONDANSETRON 8 MG/1
4 TABLET, FILM COATED ORAL ONCE
Refills: 0 | Status: COMPLETED | OUTPATIENT
Start: 2023-04-23 | End: 2023-04-23

## 2023-04-23 RX ADMIN — ONDANSETRON 4 MILLIGRAM(S): 8 TABLET, FILM COATED ORAL at 17:59

## 2023-04-23 RX ADMIN — Medication 50 MILLIGRAM(S): at 17:27

## 2023-04-23 RX ADMIN — Medication 30 MILLILITER(S): at 17:27

## 2023-04-23 RX ADMIN — SODIUM CHLORIDE 1000 MILLILITER(S): 9 INJECTION INTRAMUSCULAR; INTRAVENOUS; SUBCUTANEOUS at 18:00

## 2023-04-23 RX ADMIN — SODIUM CHLORIDE 1000 MILLILITER(S): 9 INJECTION INTRAMUSCULAR; INTRAVENOUS; SUBCUTANEOUS at 19:00

## 2023-04-23 RX ADMIN — PANTOPRAZOLE SODIUM 40 MILLIGRAM(S): 20 TABLET, DELAYED RELEASE ORAL at 17:59

## 2023-04-23 NOTE — ED PROVIDER NOTE - OBJECTIVE STATEMENT
42 years old male here c/o epigastric abd pain left chest pain abd pain nausea pt admits alcohol drinking, Pt denies recent hx of trauma, headache, dizziness, focal/distal weakness or numbness, neck/back/hips/calfs pain, cough, sob, vomiting, fever, chills, dysuria or irregular bowel movements. Pt sts he has being drinking alcohol daily for past several days last drink was earlier today.

## 2023-04-23 NOTE — ED PROVIDER NOTE - NSFOLLOWUPINSTRUCTIONS_ED_ALL_ED_FT
After an episode of pain from pancreatitis, you should start off with drinking only clear liquids, such as soup broth or gelatin. You will need to follow this diet until your symptoms get better. Slowly add other foods back to your diet when you are better.    Talk with your provider about:    Eating a healthy diet that is low in fat, with no more than 30 grams of fat per day  Eating foods that are high in protein and carbohydrates, but low in fat. Eat smaller meals, and eat more often. Your provider will help make sure you are getting enough calories to not lose weight.  Quitting smoking or using other tobacco products, if you use these substances.  Losing weight, if you are overweight.  Always talk to your provider before taking any medicines or herbs.    Do not drink any alcohol.    If your body can no longer absorb fats that you eat, your provider may ask you to take a medicine called pancreatic enzymes. These will help your body absorb fats in your food better.    You will need to take this medicine with every meal and snack. Your provider will tell you how much to take.  When you take these enzymes, you may also need to take another medicine to decrease the acid in your stomach.  If your pancreas has a lot of damage, you may also develop diabetes. You will be checked for this problem.    Managing Your Pain  Avoiding alcohol, tobacco, and foods that make your symptoms worse is the first step to controlling pain.    Use acetaminophen (Tylenol) or nonsteroidal anti-inflammatory drugs, such as ibuprofen (Advil, Motrin), at first to try and control your pain.    You will get a prescription for pain medicines. Get it filled when you go home so you have it available. If the pain is getting worse, take your pain medicine to help before the pain becomes very bad.

## 2023-04-23 NOTE — ED ADULT NURSE NOTE - FINAL NURSING ELECTRONIC SIGNATURE
23-Apr-2023 20:51 Rhombic Flap Text: The defect edges were debeveled with a #15 scalpel blade.  Given the location of the defect and the proximity to free margins a rhombic flap was deemed most appropriate.  Using a sterile surgical marker, an appropriate rhombic flap was drawn incorporating the defect.    The area thus outlined was incised deep to adipose tissue with a #15 scalpel blade.  The skin margins were undermined to an appropriate distance in all directions utilizing iris scissors.

## 2023-04-23 NOTE — ED PROVIDER NOTE - PATIENT PORTAL LINK FT
You can access the FollowMyHealth Patient Portal offered by [facility] by registering at the following website: [email or web address]. By joining [employer]’s FollowMyHealth portal, you will also be able to view your health information using other applications (apps) compatible with our system.

## 2023-04-23 NOTE — ED ADULT TRIAGE NOTE - CHIEF COMPLAINT QUOTE
Patient arrived to ED with complaints of pain to L side of chest, states he's been drinking a lot of alcoholic drink last week and he drinks daily. Pt also complaints of LUQ pain, vomited x4 today. No tremors noted.

## 2023-04-23 NOTE — ED ADULT NURSE NOTE - ED STAT RN HANDOFF DETAILS
Pt cleared to dc by Dr Horner. Dc instructions explained pt verbalized full understanding. PT left ambulatory with steady gait. VSS

## 2023-04-23 NOTE — ED PROVIDER NOTE - CLINICAL SUMMARY MEDICAL DECISION MAKING FREE TEXT BOX
hx, exam pt's ekg, labs and ct of abd/pelvis and care are signed out to the next team. pt's abd pain and left chest pain most likely due to alcohol drinking no associated sob, dizziness, cardiac is unlikely

## 2023-04-23 NOTE — ED PROVIDER NOTE - PROGRESS NOTE DETAILS
pt signed out to me by Dr. Holliday, pending CT scan    CT scan negative, lipase (+) for pancreaitits with known trigger (alcohol), pt offereed admission but states he prefers discharge, tolerating PO and has no pain at this time. will prescribe percocet and zofran, strict return precautions given. has PCP f/u tomorrow

## 2023-04-23 NOTE — ED ADULT NURSE NOTE - OBJECTIVE STATEMENT
Pt aaox4, presents to ed from home c/o luq pain, nausea and nonbloody nonbilious vomiting x4 today. Pt admits to daily etoh abuse, last drink was last night. CIWA 0 at this time. Pt denies sob, dizziness, diarrhea, fever/chills, back pain, flank pain, headache, numbness/tingling, weakness. Neuro intact at this time. Respirations even and unlabored. 12 lead ekg completed. Cardiac and spo2 monitoring in place. No acute distress noted at this time. Wife at bedside.

## 2023-04-23 NOTE — ED PROVIDER NOTE - CONSTITUTIONAL, MLM
Well appearing, awake, alert, oriented to person, place, time/situation and in no apparent distress. Speaking in clear full sentences no nasal flaring no shoulders retractions no diaphoresis + alcohol on breath, appears very comfortable lying on his left side in the stretcher in a bright light hallway normal...

## 2023-04-23 NOTE — ED PROVIDER NOTE - NSTIMEPROVIDERCAREINITIATE_GEN_ER
23-Apr-2023 16:26 As certified below, I, or a nurse practitioner or physician assistant working with me, had a face-to-face encounter that meets the physician face-to-face encounter requirements.

## 2023-07-14 ENCOUNTER — EMERGENCY (EMERGENCY)
Facility: HOSPITAL | Age: 43
LOS: 0 days | Discharge: HOME | End: 2023-07-14
Attending: EMERGENCY MEDICINE
Payer: COMMERCIAL

## 2023-07-14 VITALS
WEIGHT: 171.96 LBS | DIASTOLIC BLOOD PRESSURE: 94 MMHG | OXYGEN SATURATION: 97 % | HEIGHT: 67 IN | TEMPERATURE: 99 F | SYSTOLIC BLOOD PRESSURE: 130 MMHG | RESPIRATION RATE: 18 BRPM | HEART RATE: 96 BPM

## 2023-07-14 VITALS
SYSTOLIC BLOOD PRESSURE: 142 MMHG | OXYGEN SATURATION: 97 % | TEMPERATURE: 98 F | DIASTOLIC BLOOD PRESSURE: 90 MMHG | HEART RATE: 75 BPM | RESPIRATION RATE: 17 BRPM

## 2023-07-14 DIAGNOSIS — R11.2 NAUSEA WITH VOMITING, UNSPECIFIED: ICD-10-CM

## 2023-07-14 DIAGNOSIS — R10.9 UNSPECIFIED ABDOMINAL PAIN: ICD-10-CM

## 2023-07-14 DIAGNOSIS — I10 ESSENTIAL (PRIMARY) HYPERTENSION: ICD-10-CM

## 2023-07-14 DIAGNOSIS — F10.129 ALCOHOL ABUSE WITH INTOXICATION, UNSPECIFIED: ICD-10-CM

## 2023-07-14 DIAGNOSIS — R07.9 CHEST PAIN, UNSPECIFIED: ICD-10-CM

## 2023-07-14 DIAGNOSIS — R07.89 OTHER CHEST PAIN: ICD-10-CM

## 2023-07-14 DIAGNOSIS — Z87.442 PERSONAL HISTORY OF URINARY CALCULI: ICD-10-CM

## 2023-07-14 LAB
ALBUMIN SERPL ELPH-MCNC: 3.7 G/DL — SIGNIFICANT CHANGE UP (ref 3.3–5)
ALP SERPL-CCNC: 98 U/L — SIGNIFICANT CHANGE UP (ref 40–120)
ALT FLD-CCNC: 42 U/L — SIGNIFICANT CHANGE UP (ref 12–78)
ANION GAP SERPL CALC-SCNC: 14 MMOL/L — SIGNIFICANT CHANGE UP (ref 5–17)
APTT BLD: 24.6 SEC — LOW (ref 27.5–35.5)
AST SERPL-CCNC: 24 U/L — SIGNIFICANT CHANGE UP (ref 15–37)
BASOPHILS # BLD AUTO: 0.05 K/UL — SIGNIFICANT CHANGE UP (ref 0–0.2)
BASOPHILS NFR BLD AUTO: 0.8 % — SIGNIFICANT CHANGE UP (ref 0–2)
BILIRUB SERPL-MCNC: 0.8 MG/DL — SIGNIFICANT CHANGE UP (ref 0.2–1.2)
BUN SERPL-MCNC: 8 MG/DL — SIGNIFICANT CHANGE UP (ref 7–23)
CALCIUM SERPL-MCNC: 9 MG/DL — SIGNIFICANT CHANGE UP (ref 8.5–10.1)
CHLORIDE SERPL-SCNC: 101 MMOL/L — SIGNIFICANT CHANGE UP (ref 96–108)
CO2 SERPL-SCNC: 24 MMOL/L — SIGNIFICANT CHANGE UP (ref 22–31)
CREAT SERPL-MCNC: 1.03 MG/DL — SIGNIFICANT CHANGE UP (ref 0.5–1.3)
EGFR: 92 ML/MIN/1.73M2 — SIGNIFICANT CHANGE UP
EOSINOPHIL # BLD AUTO: 0.04 K/UL — SIGNIFICANT CHANGE UP (ref 0–0.5)
EOSINOPHIL NFR BLD AUTO: 0.6 % — SIGNIFICANT CHANGE UP (ref 0–6)
ETHANOL SERPL-MCNC: 124 MG/DL — HIGH (ref 0–10)
GLUCOSE SERPL-MCNC: 171 MG/DL — HIGH (ref 70–99)
HCT VFR BLD CALC: 43.1 % — SIGNIFICANT CHANGE UP (ref 39–50)
HGB BLD-MCNC: 14.9 G/DL — SIGNIFICANT CHANGE UP (ref 13–17)
IMM GRANULOCYTES NFR BLD AUTO: 0.5 % — SIGNIFICANT CHANGE UP (ref 0–0.9)
INR BLD: 1.08 RATIO — SIGNIFICANT CHANGE UP (ref 0.88–1.16)
LIDOCAIN IGE QN: 492 U/L — HIGH (ref 73–393)
LYMPHOCYTES # BLD AUTO: 1.81 K/UL — SIGNIFICANT CHANGE UP (ref 1–3.3)
LYMPHOCYTES # BLD AUTO: 29.4 % — SIGNIFICANT CHANGE UP (ref 13–44)
MAGNESIUM SERPL-MCNC: 1.9 MG/DL — SIGNIFICANT CHANGE UP (ref 1.6–2.6)
MCHC RBC-ENTMCNC: 28.1 PG — SIGNIFICANT CHANGE UP (ref 27–34)
MCHC RBC-ENTMCNC: 34.6 G/DL — SIGNIFICANT CHANGE UP (ref 32–36)
MCV RBC AUTO: 81.2 FL — SIGNIFICANT CHANGE UP (ref 80–100)
MONOCYTES # BLD AUTO: 0.41 K/UL — SIGNIFICANT CHANGE UP (ref 0–0.9)
MONOCYTES NFR BLD AUTO: 6.7 % — SIGNIFICANT CHANGE UP (ref 2–14)
NEUTROPHILS # BLD AUTO: 3.82 K/UL — SIGNIFICANT CHANGE UP (ref 1.8–7.4)
NEUTROPHILS NFR BLD AUTO: 62 % — SIGNIFICANT CHANGE UP (ref 43–77)
NRBC # BLD: 0 /100 WBCS — SIGNIFICANT CHANGE UP (ref 0–0)
PLATELET # BLD AUTO: 338 K/UL — SIGNIFICANT CHANGE UP (ref 150–400)
POTASSIUM SERPL-MCNC: 3.7 MMOL/L — SIGNIFICANT CHANGE UP (ref 3.5–5.3)
POTASSIUM SERPL-SCNC: 3.7 MMOL/L — SIGNIFICANT CHANGE UP (ref 3.5–5.3)
PROT SERPL-MCNC: 7.7 GM/DL — SIGNIFICANT CHANGE UP (ref 6–8.3)
PROTHROM AB SERPL-ACNC: 12.9 SEC — SIGNIFICANT CHANGE UP (ref 10.5–13.4)
RBC # BLD: 5.31 M/UL — SIGNIFICANT CHANGE UP (ref 4.2–5.8)
RBC # FLD: 12.6 % — SIGNIFICANT CHANGE UP (ref 10.3–14.5)
SODIUM SERPL-SCNC: 139 MMOL/L — SIGNIFICANT CHANGE UP (ref 135–145)
TROPONIN I, HIGH SENSITIVITY RESULT: 4.5 NG/L — SIGNIFICANT CHANGE UP
WBC # BLD: 6.16 K/UL — SIGNIFICANT CHANGE UP (ref 3.8–10.5)
WBC # FLD AUTO: 6.16 K/UL — SIGNIFICANT CHANGE UP (ref 3.8–10.5)

## 2023-07-14 PROCEDURE — 99285 EMERGENCY DEPT VISIT HI MDM: CPT

## 2023-07-14 PROCEDURE — 93010 ELECTROCARDIOGRAM REPORT: CPT

## 2023-07-14 PROCEDURE — 71045 X-RAY EXAM CHEST 1 VIEW: CPT | Mod: 26

## 2023-07-14 PROCEDURE — 74177 CT ABD & PELVIS W/CONTRAST: CPT | Mod: 26,MA

## 2023-07-14 RX ORDER — SODIUM CHLORIDE 9 MG/ML
1000 INJECTION INTRAMUSCULAR; INTRAVENOUS; SUBCUTANEOUS ONCE
Refills: 0 | Status: COMPLETED | OUTPATIENT
Start: 2023-07-14 | End: 2023-07-14

## 2023-07-14 RX ORDER — PANTOPRAZOLE SODIUM 20 MG/1
40 TABLET, DELAYED RELEASE ORAL ONCE
Refills: 0 | Status: COMPLETED | OUTPATIENT
Start: 2023-07-14 | End: 2023-07-14

## 2023-07-14 RX ORDER — KETOROLAC TROMETHAMINE 30 MG/ML
15 SYRINGE (ML) INJECTION ONCE
Refills: 0 | Status: DISCONTINUED | OUTPATIENT
Start: 2023-07-14 | End: 2023-07-14

## 2023-07-14 RX ORDER — ONDANSETRON 8 MG/1
4 TABLET, FILM COATED ORAL ONCE
Refills: 0 | Status: COMPLETED | OUTPATIENT
Start: 2023-07-14 | End: 2023-07-14

## 2023-07-14 RX ADMIN — ONDANSETRON 4 MILLIGRAM(S): 8 TABLET, FILM COATED ORAL at 07:37

## 2023-07-14 RX ADMIN — SODIUM CHLORIDE 1000 MILLILITER(S): 9 INJECTION INTRAMUSCULAR; INTRAVENOUS; SUBCUTANEOUS at 07:40

## 2023-07-14 RX ADMIN — PANTOPRAZOLE SODIUM 40 MILLIGRAM(S): 20 TABLET, DELAYED RELEASE ORAL at 07:37

## 2023-07-14 RX ADMIN — Medication 15 MILLIGRAM(S): at 07:38

## 2023-07-14 RX ADMIN — Medication 50 MILLIGRAM(S): at 07:53

## 2023-07-14 NOTE — ED PROVIDER NOTE - PATIENT PORTAL LINK FT
You can access the FollowMyHealth Patient Portal offered by [facility] by registering at the following website: [email or web address]. By joining [employer]’s FollowMyHealth portal, you will also be able to view your health information using other applications (apps) compatible with our system.

## 2023-07-14 NOTE — ED ADULT NURSE NOTE - NSFALLUNIVINTERV_ED_ALL_ED
Bed/Stretcher in lowest position, wheels locked, appropriate side rails in place/Call bell, personal items and telephone in reach/Instruct patient to call for assistance before getting out of bed/chair/stretcher/Non-slip footwear applied when patient is off stretcher/[place] to call system/Physically safe environment - no spills, clutter or unnecessary equipment/Purposeful proactive rounding/Room/bathroom lighting operational, light cord in reach

## 2023-07-14 NOTE — ED PROVIDER NOTE - OBJECTIVE STATEMENT
43 years old male here c/o non radiating left side chest pain, left side abd pain n/v x 3 days Pt also admits alcohol drinking beers x 4 days Pt's last drink was 3:00 am this morning. Pt also sts he had sono of abd and showed left side kidney stones. Pt denies recent of trauma, headache. dizziness, blurred visions, light sensitivities, focal/distal weaknessor numbness, difficulty of walking or keeping balances, cough, sob, fever, chills, dysuria, hematuria or irregular bowel movements. Pt also denies depression, harmful thoughts to himself or others, visual/auditory hallucinations.

## 2023-07-14 NOTE — ED ADULT TRIAGE NOTE - CHIEF COMPLAINT QUOTE
Patient c/o intermittent left sided chest pain and left flank pain for 2 months. Patient states he has been drinking for a few days causing multiple episodes of vomiting and said he had US last week which showed 3 kidney stones. Pmh htn.

## 2023-07-14 NOTE — ED PROVIDER NOTE - CONSTITUTIONAL, MLM
Well appearing, awake, alert, oriented to person, place, time/situation and in no apparent distress. Speaking in clear full sentences no nasal flaring no shoulders retractions no diaphoresis, smiling pleasant appears very comfortable lying in the stretcher in a bright light room normal...

## 2023-07-14 NOTE — ED PROVIDER NOTE - WR ORDER NAME 1
Mom requesting Script for Adderall Lu De León took his last pill today    Mom will send family member to  the prescription she will not get off from work before the office closes  Either her sister Nirali Soler or partner Felix Cox will  prescription Xray Chest 1 View- PORTABLE-Urgent

## 2023-07-14 NOTE — ED PROVIDER NOTE - CARE PROVIDER_API CALL
Karel Redd  Internal Medicine  [address] [place] [id number]  Phone: [phone]  Fax: [phone]  Follow Up Time: 1-3 Days

## 2023-07-14 NOTE — ED PROVIDER NOTE - CLINICAL SUMMARY MEDICAL DECISION MAKING FREE TEXT BOX
hx, exam, labs, ct of abd/pelvis pt has improved ciwa from 6 to 4 pt tolerated food orally, pt is ambulating with normal gaits without assists. hx, exam, labs, ct of abd/pelvis pt has improved ciwa from 6 to 4 pt tolerated food orally, pt is ambulating with normal gaits without assists. trop is normal

## 2023-07-14 NOTE — ED ADULT NURSE NOTE - OBJECTIVE STATEMENT
Patient c/o epigastric pain after drinking 4-5 drinks daily. Last drink (beer) reported at 3AM. Slight tremors noted to fingertips. CIWA 6, denies past h/o seizures.

## 2023-07-14 NOTE — ED PROVIDER NOTE - PROGRESS NOTE DETAILS
Pt is alert and oriented x 3 smiling pleasant pt is eating and tolerating break fast Pt 's abd again nontender to palp x all quadrant, Pt is give and explained all test reports and advised to follow up with pmd and return if symptoms persist or worsen, ct abd/pelvis no acute finding. Pt's lipase has decreased compare to prior lipase ciwa is decreased to 4 from 6 today pt is ambulating with normal gaits without assists. Pt is alert and oriented x 3 smiling pleasant pt is eating and tolerating break fast Pt 's abd again nontender to palp x all quadrant, Pt is give and explained all test reports and advised to follow up with pmd and return if symptoms persist or worsen, ct abd/pelvis no acute finding. Pt's lipase has decreased compare to prior lipase ciwa is decreased to 4 from 6 today trop is normal pt is ambulating with normal gaits without assists.

## 2023-07-16 ENCOUNTER — EMERGENCY (EMERGENCY)
Facility: HOSPITAL | Age: 43
LOS: 0 days | Discharge: HOME | End: 2023-07-16
Attending: STUDENT IN AN ORGANIZED HEALTH CARE EDUCATION/TRAINING PROGRAM
Payer: COMMERCIAL

## 2023-07-16 VITALS
SYSTOLIC BLOOD PRESSURE: 119 MMHG | OXYGEN SATURATION: 97 % | RESPIRATION RATE: 18 BRPM | DIASTOLIC BLOOD PRESSURE: 87 MMHG | TEMPERATURE: 99 F | HEART RATE: 87 BPM

## 2023-07-16 VITALS
HEIGHT: 67 IN | HEART RATE: 108 BPM | WEIGHT: 177.91 LBS | RESPIRATION RATE: 16 BRPM | DIASTOLIC BLOOD PRESSURE: 88 MMHG | SYSTOLIC BLOOD PRESSURE: 127 MMHG | TEMPERATURE: 98 F | OXYGEN SATURATION: 95 %

## 2023-07-16 DIAGNOSIS — I10 ESSENTIAL (PRIMARY) HYPERTENSION: ICD-10-CM

## 2023-07-16 DIAGNOSIS — R53.1 WEAKNESS: ICD-10-CM

## 2023-07-16 DIAGNOSIS — F10.139 ALCOHOL ABUSE WITH WITHDRAWAL, UNSPECIFIED: ICD-10-CM

## 2023-07-16 DIAGNOSIS — R11.2 NAUSEA WITH VOMITING, UNSPECIFIED: ICD-10-CM

## 2023-07-16 DIAGNOSIS — R61 GENERALIZED HYPERHIDROSIS: ICD-10-CM

## 2023-07-16 LAB
ALBUMIN SERPL ELPH-MCNC: 3.4 G/DL — SIGNIFICANT CHANGE UP (ref 3.3–5)
ALP SERPL-CCNC: 101 U/L — SIGNIFICANT CHANGE UP (ref 40–120)
ALT FLD-CCNC: 52 U/L — SIGNIFICANT CHANGE UP (ref 12–78)
ANION GAP SERPL CALC-SCNC: 7 MMOL/L — SIGNIFICANT CHANGE UP (ref 5–17)
APAP SERPL-MCNC: < 2 UG/ML (ref 10–30)
AST SERPL-CCNC: 38 U/L — HIGH (ref 15–37)
BASOPHILS # BLD AUTO: 0.06 K/UL — SIGNIFICANT CHANGE UP (ref 0–0.2)
BASOPHILS NFR BLD AUTO: 0.7 % — SIGNIFICANT CHANGE UP (ref 0–2)
BILIRUB SERPL-MCNC: 0.6 MG/DL — SIGNIFICANT CHANGE UP (ref 0.2–1.2)
BUN SERPL-MCNC: 12 MG/DL — SIGNIFICANT CHANGE UP (ref 7–23)
CALCIUM SERPL-MCNC: 8.4 MG/DL — LOW (ref 8.5–10.1)
CHLORIDE SERPL-SCNC: 109 MMOL/L — HIGH (ref 96–108)
CO2 SERPL-SCNC: 27 MMOL/L — SIGNIFICANT CHANGE UP (ref 22–31)
CREAT SERPL-MCNC: 1.04 MG/DL — SIGNIFICANT CHANGE UP (ref 0.5–1.3)
EGFR: 91 ML/MIN/1.73M2 — SIGNIFICANT CHANGE UP
EOSINOPHIL # BLD AUTO: 0.12 K/UL — SIGNIFICANT CHANGE UP (ref 0–0.5)
EOSINOPHIL NFR BLD AUTO: 1.5 % — SIGNIFICANT CHANGE UP (ref 0–6)
ETHANOL SERPL-MCNC: 290 MG/DL — HIGH (ref 0–10)
GLUCOSE SERPL-MCNC: 146 MG/DL — HIGH (ref 70–99)
HCT VFR BLD CALC: 44 % — SIGNIFICANT CHANGE UP (ref 39–50)
HGB BLD-MCNC: 15.3 G/DL — SIGNIFICANT CHANGE UP (ref 13–17)
IMM GRANULOCYTES NFR BLD AUTO: 0.2 % — SIGNIFICANT CHANGE UP (ref 0–0.9)
LIDOCAIN IGE QN: 802 U/L — HIGH (ref 73–393)
LYMPHOCYTES # BLD AUTO: 3.66 K/UL — HIGH (ref 1–3.3)
LYMPHOCYTES # BLD AUTO: 45.1 % — HIGH (ref 13–44)
MAGNESIUM SERPL-MCNC: 2.1 MG/DL — SIGNIFICANT CHANGE UP (ref 1.6–2.6)
MCHC RBC-ENTMCNC: 28.1 PG — SIGNIFICANT CHANGE UP (ref 27–34)
MCHC RBC-ENTMCNC: 34.8 G/DL — SIGNIFICANT CHANGE UP (ref 32–36)
MCV RBC AUTO: 80.7 FL — SIGNIFICANT CHANGE UP (ref 80–100)
MONOCYTES # BLD AUTO: 0.41 K/UL — SIGNIFICANT CHANGE UP (ref 0–0.9)
MONOCYTES NFR BLD AUTO: 5 % — SIGNIFICANT CHANGE UP (ref 2–14)
NEUTROPHILS # BLD AUTO: 3.85 K/UL — SIGNIFICANT CHANGE UP (ref 1.8–7.4)
NEUTROPHILS NFR BLD AUTO: 47.5 % — SIGNIFICANT CHANGE UP (ref 43–77)
NRBC # BLD: 0 /100 WBCS — SIGNIFICANT CHANGE UP (ref 0–0)
PLATELET # BLD AUTO: 343 K/UL — SIGNIFICANT CHANGE UP (ref 150–400)
POTASSIUM SERPL-MCNC: 3.6 MMOL/L — SIGNIFICANT CHANGE UP (ref 3.5–5.3)
POTASSIUM SERPL-SCNC: 3.6 MMOL/L — SIGNIFICANT CHANGE UP (ref 3.5–5.3)
PROT SERPL-MCNC: 7.4 GM/DL — SIGNIFICANT CHANGE UP (ref 6–8.3)
RBC # BLD: 5.45 M/UL — SIGNIFICANT CHANGE UP (ref 4.2–5.8)
RBC # FLD: 12.5 % — SIGNIFICANT CHANGE UP (ref 10.3–14.5)
SALICYLATES SERPL-MCNC: <1.7 MG/DL — LOW (ref 2.8–20)
SODIUM SERPL-SCNC: 143 MMOL/L — SIGNIFICANT CHANGE UP (ref 135–145)
TROPONIN I, HIGH SENSITIVITY RESULT: 4.8 NG/L — SIGNIFICANT CHANGE UP
WBC # BLD: 8.12 K/UL — SIGNIFICANT CHANGE UP (ref 3.8–10.5)
WBC # FLD AUTO: 8.12 K/UL — SIGNIFICANT CHANGE UP (ref 3.8–10.5)

## 2023-07-16 PROCEDURE — 93010 ELECTROCARDIOGRAM REPORT: CPT

## 2023-07-16 PROCEDURE — 99284 EMERGENCY DEPT VISIT MOD MDM: CPT

## 2023-07-16 RX ORDER — SODIUM CHLORIDE 9 MG/ML
1000 INJECTION INTRAMUSCULAR; INTRAVENOUS; SUBCUTANEOUS ONCE
Refills: 0 | Status: COMPLETED | OUTPATIENT
Start: 2023-07-16 | End: 2023-07-16

## 2023-07-16 RX ORDER — ONDANSETRON 8 MG/1
4 TABLET, FILM COATED ORAL ONCE
Refills: 0 | Status: COMPLETED | OUTPATIENT
Start: 2023-07-16 | End: 2023-07-16

## 2023-07-16 RX ADMIN — SODIUM CHLORIDE 1000 MILLILITER(S): 9 INJECTION INTRAMUSCULAR; INTRAVENOUS; SUBCUTANEOUS at 17:49

## 2023-07-16 RX ADMIN — Medication 100 MILLIGRAM(S): at 17:52

## 2023-07-16 RX ADMIN — SODIUM CHLORIDE 1000 MILLILITER(S): 9 INJECTION INTRAMUSCULAR; INTRAVENOUS; SUBCUTANEOUS at 22:39

## 2023-07-16 RX ADMIN — ONDANSETRON 4 MILLIGRAM(S): 8 TABLET, FILM COATED ORAL at 22:09

## 2023-07-16 RX ADMIN — SODIUM CHLORIDE 1000 MILLILITER(S): 9 INJECTION INTRAMUSCULAR; INTRAVENOUS; SUBCUTANEOUS at 21:33

## 2023-07-16 RX ADMIN — ONDANSETRON 4 MILLIGRAM(S): 8 TABLET, FILM COATED ORAL at 17:52

## 2023-07-16 NOTE — ED ADULT NURSE NOTE - NSFALLRISKINTERV_ED_ALL_ED
Assistance with ambulation/Communicate fall risk and risk factors to all staff, patient, and family/Monitor gait and stability/Monitor for mental status changes and reorient to person, place, and time, as needed/Provide visual cue: yellow wristband, yellow gown, etc/Reinforce activity limits and safety measures with patient and family/Toileting schedule using arm’s reach rule for commode and bathroom/Call bell, personal items and telephone in reach/Instruct patient to call for assistance before getting out of bed/chair/stretcher/Non-slip footwear applied when patient is off stretcher/[place] to call system/Physically safe environment - no spills, clutter or unnecessary equipment/Purposeful Proactive Rounding/Room/bathroom lighting operational, light cord in reach

## 2023-07-16 NOTE — ED ADULT NURSE NOTE - HOW OFTEN DO YOU HAVE SIX OR MORE DRINKS ON ONE OCCASION?
TRANSITIONAL CARE MANAGEMENT - HOSPITAL DISCHARGE FOLLOW-UP    Contacted Ms. Hardik Ochoa regarding follow-up for acute respiratory failure after hospital discharge. She was discharged from the hospital on 4/9/2020. Review of the After Visit Summary from the recent hospitalization indicates that the patient needs to follow up with \"PCP and cancer doctor\".       Her diet concern is none. Overall, the patient is eating well.   Ambulation: improved  Fever: is not present  Pain: none  Activities of Daily Living (global): Partial assistance   Patient states that she does have sufficient family support. She feels that she is able to ask for assistance when needed.     Additional patient/family concerns: None .    Discharge medications were verified with the patient. She is fully compliant with the medication regimen prescribed at the time of discharge. She reports that she is not experiencing any medication side effects. \"I need a refill on lorazepam.\"     Upon discharge, the patient was to receive follow up with PCP. *    Advance Directives:  on file    TCM scheduled for 4/14/2020 at 1400.    Routed to PCP as VIGNESH     Two to three times per week

## 2023-07-16 NOTE — ED PROVIDER NOTE - PHYSICAL EXAMINATION
General: mildly tremulous appearing male in no acute distress  HEENT: Normocephalic, atraumatic. Moist mucous membranes. Oropharynx clear. No lymphadenopathy.  Eyes: No scleral icterus. EOMI. TIMMY.  Neck:. Soft and supple. Full ROM without pain. No midline tenderness  Cardiac: Regular rate and regular rhythm. No murmurs, rubs, gallops. Peripheral pulses 2+ and symmetric. No LE edema.  Resp: Lungs CTAB. Speaking in full sentences. No wheezes, rales or rhonchi.  Abd: Soft, non-tender, non-distended. No guarding or rebound. No scars, masses, or lesions.  Back: Spine midline and non-tender. No CVA tenderness.    Skin: No rashes, abrasions, or lacerations.  Neuro: AO x 3. Moves all extremities symmetrically. Motor strength and sensation grossly intact.

## 2023-07-16 NOTE — ED PROVIDER NOTE - OBJECTIVE STATEMENT
42 y/o M hx of alcohol abuse, alcohol withdrawal presents for weakness. states last alcoholic drink was 2 days ago. endorsing nausea w/ 1 episode of non-bloody emesis today. denies other drug use. endorsing feeling anxious/sweaty. denies visual hallucinations. denies other drug use. denies chest pain. denies abdominal pain. denies trauma. denies falls.

## 2023-07-16 NOTE — ED ADULT TRIAGE NOTE - AS PAIN REST
0 (no pain/absence of nonverbal indicators of pain) Pupils equal, round and reactive to light, Extra-ocular movement intact, eyes are clear b/l

## 2023-07-16 NOTE — ED PROVIDER NOTE - PROGRESS NOTE DETAILS
patient A&Ox3, tolerated PO intake. wife pat here at bedside to take patient home. she states she feels comfortable taking him home via uber. patient has no abdominal pain, benign abdomen. no signs of acute alcohol withdrawal currently. well appearing, significantly improved compared to arrival. return precautions discussed. lipase elevated but benign abdomen and tolerating PO. CIWA 1 currently.

## 2023-07-16 NOTE — ED ADULT TRIAGE NOTE - CHIEF COMPLAINT QUOTE
Patient walked in with complaints of generalized body weakness, increased tiredness, hx of alcohol abuse, no tremors noted, pt actively vomiting. HTN

## 2023-07-16 NOTE — ED PROVIDER NOTE - NS ED ROS FT
General: Denies fever, chills  HEENT: Denies sensory changes, sore throat  Neck: Denies neck pain, neck stiffness  Resp: Denies coughing, SOB  Cardiovascular: Denies CP, palpitations, LE edema  GI: +nausea, vomiting, Denies abdominal pain, diarrhea, constipation, blood in stool  : Denies dysuria, hematuria, frequency, incontinence  MSK: Denies back pain  Neuro: Denies HA, dizziness, numbness, + weakness  Skin: Denies rashes.

## 2023-07-16 NOTE — ED ADULT NURSE NOTE - OBJECTIVE STATEMENT
patient 44 yo male presenting w/ alcohol intoxication. patient c/o generalized weakness and increased fatigue. patient also reports that he has also been having increased tremors. pt also c/o fo 10/10 headache. patient placed on cardiac monitor. EKG completed. dr. michelle at bedside to evaluate. patient 44 yo male presenting w/ alcohol intoxication. patient c/o generalized weakness and increased fatigue. patient also reports that he has also been having increased tremors. pt also c/o fo 10/10 headache. patient placed on cardiac monitor. EKG completed. dr. michelle at bedside to evaluate. states last drink 2 days ago.

## 2023-07-16 NOTE — ED PROVIDER NOTE - CLINICAL SUMMARY MEDICAL DECISION MAKING FREE TEXT BOX
42 y/o M hx of alcohol abuse, alcohol withdrawal presents for weakness. states last alcoholic drink was 2 days ago. endorsing nausea w/ 1 episode of non-bloody emesis today.   mildly tremulous appearing male, CIWA 8 on arrival. feeling anxious, sweaty. well appearing, denies visual hallucinations. no tongue fasciculations. will give librium to prevent further etoh withdrawal, likely ETOH intoxication as well. will check alcohol level, fluid bolus. benign abdominal exam. +n/v. anti-emetics to be given. A&Ox3.

## 2023-07-16 NOTE — ED PROVIDER NOTE - PATIENT PORTAL LINK FT
You can access the FollowMyHealth Patient Portal offered by [facility] by registering at the following website: [email or web address]. By joining [employer]’s FollowMyHealth portal, you will also be able to view your health information using other applications (apps) compatible with our system.

## 2023-07-17 ENCOUNTER — EMERGENCY (EMERGENCY)
Facility: HOSPITAL | Age: 43
LOS: 0 days | Discharge: HOME | End: 2023-07-17
Attending: EMERGENCY MEDICINE
Payer: MEDICAID

## 2023-07-17 VITALS
TEMPERATURE: 99 F | RESPIRATION RATE: 20 BRPM | HEIGHT: 67 IN | SYSTOLIC BLOOD PRESSURE: 146 MMHG | HEART RATE: 99 BPM | DIASTOLIC BLOOD PRESSURE: 96 MMHG | OXYGEN SATURATION: 98 % | WEIGHT: 154.98 LBS

## 2023-07-17 VITALS
HEART RATE: 80 BPM | OXYGEN SATURATION: 98 % | SYSTOLIC BLOOD PRESSURE: 146 MMHG | DIASTOLIC BLOOD PRESSURE: 102 MMHG | RESPIRATION RATE: 18 BRPM | TEMPERATURE: 98 F

## 2023-07-17 DIAGNOSIS — R10.30 LOWER ABDOMINAL PAIN, UNSPECIFIED: ICD-10-CM

## 2023-07-17 DIAGNOSIS — R19.7 DIARRHEA, UNSPECIFIED: ICD-10-CM

## 2023-07-17 DIAGNOSIS — I10 ESSENTIAL (PRIMARY) HYPERTENSION: ICD-10-CM

## 2023-07-17 DIAGNOSIS — K52.9 NONINFECTIVE GASTROENTERITIS AND COLITIS, UNSPECIFIED: ICD-10-CM

## 2023-07-17 DIAGNOSIS — F10.10 ALCOHOL ABUSE, UNCOMPLICATED: ICD-10-CM

## 2023-07-17 LAB
ANION GAP SERPL CALC-SCNC: 8 MMOL/L — SIGNIFICANT CHANGE UP (ref 5–17)
BUN SERPL-MCNC: 14 MG/DL — SIGNIFICANT CHANGE UP (ref 7–23)
CALCIUM SERPL-MCNC: 8.4 MG/DL — LOW (ref 8.5–10.1)
CHLORIDE SERPL-SCNC: 108 MMOL/L — SIGNIFICANT CHANGE UP (ref 96–108)
CO2 SERPL-SCNC: 26 MMOL/L — SIGNIFICANT CHANGE UP (ref 22–31)
CREAT SERPL-MCNC: 1.15 MG/DL — SIGNIFICANT CHANGE UP (ref 0.5–1.3)
EGFR: 80 ML/MIN/1.73M2 — SIGNIFICANT CHANGE UP
ETHANOL SERPL-MCNC: 186 MG/DL — HIGH (ref 0–10)
GLUCOSE SERPL-MCNC: 186 MG/DL — HIGH (ref 70–99)
HCT VFR BLD CALC: 43.7 % — SIGNIFICANT CHANGE UP (ref 39–50)
HGB BLD-MCNC: 15 G/DL — SIGNIFICANT CHANGE UP (ref 13–17)
MAGNESIUM SERPL-MCNC: 2 MG/DL — SIGNIFICANT CHANGE UP (ref 1.6–2.6)
MCHC RBC-ENTMCNC: 27.8 PG — SIGNIFICANT CHANGE UP (ref 27–34)
MCHC RBC-ENTMCNC: 34.3 G/DL — SIGNIFICANT CHANGE UP (ref 32–36)
MCV RBC AUTO: 81.1 FL — SIGNIFICANT CHANGE UP (ref 80–100)
NRBC # BLD: 0 /100 WBCS — SIGNIFICANT CHANGE UP (ref 0–0)
PHOSPHATE SERPL-MCNC: 2 MG/DL — LOW (ref 2.5–4.5)
PLATELET # BLD AUTO: 323 K/UL — SIGNIFICANT CHANGE UP (ref 150–400)
POTASSIUM SERPL-MCNC: 3.6 MMOL/L — SIGNIFICANT CHANGE UP (ref 3.5–5.3)
POTASSIUM SERPL-SCNC: 3.6 MMOL/L — SIGNIFICANT CHANGE UP (ref 3.5–5.3)
RBC # BLD: 5.39 M/UL — SIGNIFICANT CHANGE UP (ref 4.2–5.8)
RBC # FLD: 12.5 % — SIGNIFICANT CHANGE UP (ref 10.3–14.5)
SODIUM SERPL-SCNC: 142 MMOL/L — SIGNIFICANT CHANGE UP (ref 135–145)
WBC # BLD: 7.96 K/UL — SIGNIFICANT CHANGE UP (ref 3.8–10.5)
WBC # FLD AUTO: 7.96 K/UL — SIGNIFICANT CHANGE UP (ref 3.8–10.5)

## 2023-07-17 PROCEDURE — 99284 EMERGENCY DEPT VISIT MOD MDM: CPT

## 2023-07-17 RX ORDER — SODIUM CHLORIDE 9 MG/ML
2000 INJECTION INTRAMUSCULAR; INTRAVENOUS; SUBCUTANEOUS ONCE
Refills: 0 | Status: COMPLETED | OUTPATIENT
Start: 2023-07-17 | End: 2023-07-17

## 2023-07-17 RX ORDER — THIAMINE MONONITRATE (VIT B1) 100 MG
100 TABLET ORAL ONCE
Refills: 0 | Status: COMPLETED | OUTPATIENT
Start: 2023-07-17 | End: 2023-07-17

## 2023-07-17 RX ORDER — AMLODIPINE BESYLATE 2.5 MG/1
1 TABLET ORAL
Qty: 0 | Refills: 0 | DISCHARGE

## 2023-07-17 RX ORDER — FOLIC ACID 0.8 MG
1 TABLET ORAL ONCE
Refills: 0 | Status: COMPLETED | OUTPATIENT
Start: 2023-07-17 | End: 2023-07-17

## 2023-07-17 RX ADMIN — SODIUM CHLORIDE 1000 MILLILITER(S): 9 INJECTION INTRAMUSCULAR; INTRAVENOUS; SUBCUTANEOUS at 09:37

## 2023-07-17 RX ADMIN — Medication 100 MILLIGRAM(S): at 11:22

## 2023-07-17 RX ADMIN — Medication 1 MILLIGRAM(S): at 11:27

## 2023-07-17 NOTE — ED ADULT NURSE NOTE - NSFALLUNIVINTERV_ED_ALL_ED
Bed/Stretcher in lowest position, wheels locked, appropriate side rails in place/Call bell, personal items and telephone in reach/Instruct patient to call for assistance before getting out of bed/chair/stretcher/Non-slip footwear applied when patient is off stretcher/[place] to call system/Physically safe environment - no spills, clutter or unnecessary equipment/Purposeful proactive rounding/Room/bathroom lighting operational, light cord in reach

## 2023-07-17 NOTE — ED ADULT TRIAGE NOTE - CHIEF COMPLAINT QUOTE
Patient recently discharged from ED last night states still feeling sick with weakness and fatigue, also voiced went home and had a small beer that contained 3% of alcohol. No tremors, vomiting or auditory tactile noted at triage.

## 2023-07-17 NOTE — ED PROVIDER NOTE - NS ED ATTENDING STATEMENT MOD
Care plans initiated.
Problem: Falls - Risk of:  Goal: Will remain free from falls  Description: Will remain free from falls  Outcome: Met This Shift  Goal: Absence of physical injury  Description: Absence of physical injury  Outcome: Met This Shift     Problem: Skin Integrity:  Goal: Will show no infection signs and symptoms  Description: Will show no infection signs and symptoms  Outcome: Met This Shift  Goal: Absence of new skin breakdown  Description: Absence of new skin breakdown  Outcome: Met This Shift     Problem: Pain:  Goal: Pain level will decrease  Description: Pain level will decrease  Outcome: Met This Shift  Goal: Control of acute pain  Description: Control of acute pain  Outcome: Met This Shift     Problem: Gas Exchange - Impaired  Goal: Able to breathe comfortably  Description: Able to breathe comfortably  Outcome: Met This Shift     Problem: OXYGENATION/RESPIRATORY FUNCTION  Goal: Patient will maintain patent airway  Outcome: Met This Shift  Goal: Patient will achieve/maintain normal respiratory rate/effort  Description: Respiratory rate and effort will be within normal limits for the patient  Outcome: Met This Shift
Pt discharged bact to [facility].
Attending Only

## 2023-07-17 NOTE — ED PROVIDER NOTE - OBJECTIVE STATEMENT
43-year-old male complaining of lower abdominal pain upset stomach and diarrhea.  Patient denies fever vomiting.  Patient appears to be intoxicated.

## 2023-07-17 NOTE — ED ADULT NURSE NOTE - OBJECTIVE STATEMENT
Pt in ED with C/O of not feeling well. Was discharged from this ED last night and went home and consumed alcoholic beverage and began to not feel well. Expresses having a headache, dizziness and feeling fatigued. Pt in no distress. Will continue to monitor pt. Pt in ED with C/O of not feeling well. Was discharged from this ED last night and went home and consumed alcoholic beverage and began to not feel well. Expresses having a headache, dizziness and feeling fatigued. Pt in no distress. Will continue to monitor pt. . Pt in ED with C/O of not feeling well. Was discharged from this ED last night and went home and consumed alcoholic beverage and began to not feel well. Expresses having a headache, dizziness, diarrhea and feeling fatigued. Denies chest pain and SOB. Pt in no distress. Will continue to monitor pt. .

## 2023-07-17 NOTE — ED PROVIDER NOTE - PATIENT PORTAL LINK FT
You can access the FollowMyHealth Patient Portal offered by [facility] by registering at the following website: [email or web address]. By joining [employer]’s FollowMyHealth portal, you will also be able to view your health information using other applications (apps) compatible with our system.

## 2023-07-17 NOTE — ED PROVIDER NOTE - PHYSICAL EXAMINATION
Oh:  General: No distress.  Mentation at baseline.   HEENT: WNL  Chest/Lungs: CTAB, No wheeze, No retractions, No increased work of breathing, Normal rate  Heart: S1S2 RRR, No M/R/G, Pules equal Bilaterally in upper and lower extremities distally  Abd: soft, NT/ND, No guarding, No rebound.  No hernias, no palpable masses.  Extrem: FROM in all joints, no significant edema noted, No ulcers.  Cap refil < 2sec.  Skin: No rash noted, warm dry.  Neuro:  Grossly normal.  No difficulty ambulating. No focal deficits.  Psychiatric: No evidence of delusions. No SI/HI.

## 2023-07-19 ENCOUNTER — INPATIENT (INPATIENT)
Facility: HOSPITAL | Age: 43
LOS: 4 days | Discharge: HOME | End: 2023-07-24
Attending: INTERNAL MEDICINE | Admitting: INTERNAL MEDICINE
Payer: MEDICAID

## 2023-07-19 VITALS
TEMPERATURE: 98 F | HEIGHT: 67 IN | RESPIRATION RATE: 10 BRPM | OXYGEN SATURATION: 96 % | HEART RATE: 120 BPM | SYSTOLIC BLOOD PRESSURE: 143 MMHG | WEIGHT: 169.98 LBS | DIASTOLIC BLOOD PRESSURE: 99 MMHG

## 2023-07-19 DIAGNOSIS — E78.5 HYPERLIPIDEMIA, UNSPECIFIED: ICD-10-CM

## 2023-07-19 DIAGNOSIS — R79.89 OTHER SPECIFIED ABNORMAL FINDINGS OF BLOOD CHEMISTRY: ICD-10-CM

## 2023-07-19 DIAGNOSIS — K85.90 ACUTE PANCREATITIS WITHOUT NECROSIS OR INFECTION, UNSPECIFIED: ICD-10-CM

## 2023-07-19 DIAGNOSIS — F10.20 ALCOHOL DEPENDENCE, UNCOMPLICATED: ICD-10-CM

## 2023-07-19 DIAGNOSIS — I10 ESSENTIAL (PRIMARY) HYPERTENSION: ICD-10-CM

## 2023-07-19 LAB
ALBUMIN SERPL ELPH-MCNC: 3.7 G/DL — SIGNIFICANT CHANGE UP (ref 3.3–5)
ALP SERPL-CCNC: 113 U/L — SIGNIFICANT CHANGE UP (ref 40–120)
ALT FLD-CCNC: 337 U/L — HIGH (ref 12–78)
ANION GAP SERPL CALC-SCNC: 10 MMOL/L — SIGNIFICANT CHANGE UP (ref 5–17)
AST SERPL-CCNC: 403 U/L — HIGH (ref 15–37)
BASOPHILS # BLD AUTO: 0.05 K/UL — SIGNIFICANT CHANGE UP (ref 0–0.2)
BASOPHILS NFR BLD AUTO: 0.4 % — SIGNIFICANT CHANGE UP (ref 0–2)
BILIRUB SERPL-MCNC: 1.2 MG/DL — SIGNIFICANT CHANGE UP (ref 0.2–1.2)
BUN SERPL-MCNC: 14 MG/DL — SIGNIFICANT CHANGE UP (ref 7–23)
CALCIUM SERPL-MCNC: 8.6 MG/DL — SIGNIFICANT CHANGE UP (ref 8.5–10.1)
CHLORIDE SERPL-SCNC: 100 MMOL/L — SIGNIFICANT CHANGE UP (ref 96–108)
CHOLEST SERPL-MCNC: 129 MG/DL — SIGNIFICANT CHANGE UP
CO2 SERPL-SCNC: 25 MMOL/L — SIGNIFICANT CHANGE UP (ref 22–31)
CREAT SERPL-MCNC: 1.14 MG/DL — SIGNIFICANT CHANGE UP (ref 0.5–1.3)
EGFR: 81 ML/MIN/1.73M2 — SIGNIFICANT CHANGE UP
EOSINOPHIL # BLD AUTO: 0.22 K/UL — SIGNIFICANT CHANGE UP (ref 0–0.5)
EOSINOPHIL NFR BLD AUTO: 1.6 % — SIGNIFICANT CHANGE UP (ref 0–6)
ETHANOL SERPL-MCNC: <10 MG/DL — SIGNIFICANT CHANGE UP (ref 0–10)
GLUCOSE SERPL-MCNC: 119 MG/DL — HIGH (ref 70–99)
HCT VFR BLD CALC: 45.3 % — SIGNIFICANT CHANGE UP (ref 39–50)
HDLC SERPL-MCNC: 23 MG/DL — LOW
HGB BLD-MCNC: 15.8 G/DL — SIGNIFICANT CHANGE UP (ref 13–17)
IMM GRANULOCYTES NFR BLD AUTO: 0.6 % — SIGNIFICANT CHANGE UP (ref 0–0.9)
LIDOCAIN IGE QN: 1111 U/L — HIGH (ref 73–393)
LIPID PNL WITH DIRECT LDL SERPL: SIGNIFICANT CHANGE UP MG/DL
LYMPHOCYTES # BLD AUTO: 1.51 K/UL — SIGNIFICANT CHANGE UP (ref 1–3.3)
LYMPHOCYTES # BLD AUTO: 10.9 % — LOW (ref 13–44)
MCHC RBC-ENTMCNC: 28 PG — SIGNIFICANT CHANGE UP (ref 27–34)
MCHC RBC-ENTMCNC: 34.9 G/DL — SIGNIFICANT CHANGE UP (ref 32–36)
MCV RBC AUTO: 80.3 FL — SIGNIFICANT CHANGE UP (ref 80–100)
MONOCYTES # BLD AUTO: 0.6 K/UL — SIGNIFICANT CHANGE UP (ref 0–0.9)
MONOCYTES NFR BLD AUTO: 4.4 % — SIGNIFICANT CHANGE UP (ref 2–14)
NEUTROPHILS # BLD AUTO: 11.33 K/UL — HIGH (ref 1.8–7.4)
NEUTROPHILS NFR BLD AUTO: 82.1 % — HIGH (ref 43–77)
NON HDL CHOLESTEROL: 106 MG/DL — SIGNIFICANT CHANGE UP
NRBC # BLD: 0 /100 WBCS — SIGNIFICANT CHANGE UP (ref 0–0)
PLATELET # BLD AUTO: 260 K/UL — SIGNIFICANT CHANGE UP (ref 150–400)
POTASSIUM SERPL-MCNC: 3.5 MMOL/L — SIGNIFICANT CHANGE UP (ref 3.5–5.3)
POTASSIUM SERPL-SCNC: 3.5 MMOL/L — SIGNIFICANT CHANGE UP (ref 3.5–5.3)
PROT SERPL-MCNC: 7.7 GM/DL — SIGNIFICANT CHANGE UP (ref 6–8.3)
RBC # BLD: 5.64 M/UL — SIGNIFICANT CHANGE UP (ref 4.2–5.8)
RBC # FLD: 12.3 % — SIGNIFICANT CHANGE UP (ref 10.3–14.5)
SODIUM SERPL-SCNC: 135 MMOL/L — SIGNIFICANT CHANGE UP (ref 135–145)
TRIGL SERPL-MCNC: 805 MG/DL — HIGH
TROPONIN I, HIGH SENSITIVITY RESULT: 4.8 NG/L — SIGNIFICANT CHANGE UP
WBC # BLD: 13.79 K/UL — HIGH (ref 3.8–10.5)
WBC # FLD AUTO: 13.79 K/UL — HIGH (ref 3.8–10.5)

## 2023-07-19 PROCEDURE — 71045 X-RAY EXAM CHEST 1 VIEW: CPT | Mod: 26

## 2023-07-19 PROCEDURE — 99222 1ST HOSP IP/OBS MODERATE 55: CPT

## 2023-07-19 PROCEDURE — 99285 EMERGENCY DEPT VISIT HI MDM: CPT

## 2023-07-19 PROCEDURE — 76700 US EXAM ABDOM COMPLETE: CPT | Mod: 26

## 2023-07-19 PROCEDURE — 93010 ELECTROCARDIOGRAM REPORT: CPT

## 2023-07-19 PROCEDURE — 74177 CT ABD & PELVIS W/CONTRAST: CPT | Mod: 26

## 2023-07-19 RX ORDER — METOPROLOL TARTRATE 50 MG
50 TABLET ORAL DAILY
Refills: 0 | Status: DISCONTINUED | OUTPATIENT
Start: 2023-07-19 | End: 2023-07-24

## 2023-07-19 RX ORDER — ONDANSETRON 8 MG/1
4 TABLET, FILM COATED ORAL ONCE
Refills: 0 | Status: COMPLETED | OUTPATIENT
Start: 2023-07-19 | End: 2023-07-19

## 2023-07-19 RX ORDER — MORPHINE SULFATE 50 MG/1
2 CAPSULE, EXTENDED RELEASE ORAL EVERY 4 HOURS
Refills: 0 | Status: DISCONTINUED | OUTPATIENT
Start: 2023-07-19 | End: 2023-07-24

## 2023-07-19 RX ORDER — SODIUM CHLORIDE 9 MG/ML
1000 INJECTION, SOLUTION INTRAVENOUS
Refills: 0 | Status: DISCONTINUED | OUTPATIENT
Start: 2023-07-19 | End: 2023-07-20

## 2023-07-19 RX ORDER — LANOLIN ALCOHOL/MO/W.PET/CERES
3 CREAM (GRAM) TOPICAL AT BEDTIME
Refills: 0 | Status: DISCONTINUED | OUTPATIENT
Start: 2023-07-19 | End: 2023-07-24

## 2023-07-19 RX ORDER — MORPHINE SULFATE 50 MG/1
4 CAPSULE, EXTENDED RELEASE ORAL ONCE
Refills: 0 | Status: DISCONTINUED | OUTPATIENT
Start: 2023-07-19 | End: 2023-07-19

## 2023-07-19 RX ORDER — OXYCODONE HYDROCHLORIDE 5 MG/1
5 TABLET ORAL EVERY 6 HOURS
Refills: 0 | Status: DISCONTINUED | OUTPATIENT
Start: 2023-07-19 | End: 2023-07-24

## 2023-07-19 RX ORDER — AMLODIPINE BESYLATE 2.5 MG/1
10 TABLET ORAL DAILY
Refills: 0 | Status: DISCONTINUED | OUTPATIENT
Start: 2023-07-19 | End: 2023-07-24

## 2023-07-19 RX ORDER — THIAMINE MONONITRATE (VIT B1) 100 MG
100 TABLET ORAL ONCE
Refills: 0 | Status: COMPLETED | OUTPATIENT
Start: 2023-07-19 | End: 2023-07-19

## 2023-07-19 RX ORDER — THIAMINE MONONITRATE (VIT B1) 100 MG
100 TABLET ORAL DAILY
Refills: 0 | Status: DISCONTINUED | OUTPATIENT
Start: 2023-07-20 | End: 2023-07-24

## 2023-07-19 RX ORDER — SODIUM CHLORIDE 9 MG/ML
1000 INJECTION INTRAMUSCULAR; INTRAVENOUS; SUBCUTANEOUS ONCE
Refills: 0 | Status: COMPLETED | OUTPATIENT
Start: 2023-07-19 | End: 2023-07-19

## 2023-07-19 RX ORDER — FOLIC ACID 0.8 MG
1 TABLET ORAL DAILY
Refills: 0 | Status: DISCONTINUED | OUTPATIENT
Start: 2023-07-19 | End: 2023-07-24

## 2023-07-19 RX ORDER — PANTOPRAZOLE SODIUM 20 MG/1
40 TABLET, DELAYED RELEASE ORAL
Refills: 0 | Status: DISCONTINUED | OUTPATIENT
Start: 2023-07-19 | End: 2023-07-24

## 2023-07-19 RX ORDER — ONDANSETRON 8 MG/1
4 TABLET, FILM COATED ORAL EVERY 8 HOURS
Refills: 0 | Status: DISCONTINUED | OUTPATIENT
Start: 2023-07-19 | End: 2023-07-24

## 2023-07-19 RX ADMIN — Medication 100 MILLIGRAM(S): at 13:30

## 2023-07-19 RX ADMIN — MORPHINE SULFATE 4 MILLIGRAM(S): 50 CAPSULE, EXTENDED RELEASE ORAL at 09:11

## 2023-07-19 RX ADMIN — Medication 1 TABLET(S): at 11:57

## 2023-07-19 RX ADMIN — Medication 1 MILLIGRAM(S): at 13:30

## 2023-07-19 RX ADMIN — ONDANSETRON 4 MILLIGRAM(S): 8 TABLET, FILM COATED ORAL at 07:26

## 2023-07-19 RX ADMIN — Medication 50 MILLIGRAM(S): at 11:57

## 2023-07-19 RX ADMIN — SODIUM CHLORIDE 175 MILLILITER(S): 9 INJECTION, SOLUTION INTRAVENOUS at 11:56

## 2023-07-19 RX ADMIN — AMLODIPINE BESYLATE 10 MILLIGRAM(S): 2.5 TABLET ORAL at 11:57

## 2023-07-19 RX ADMIN — SODIUM CHLORIDE 1000 MILLILITER(S): 9 INJECTION INTRAMUSCULAR; INTRAVENOUS; SUBCUTANEOUS at 07:28

## 2023-07-19 RX ADMIN — PANTOPRAZOLE SODIUM 40 MILLIGRAM(S): 20 TABLET, DELAYED RELEASE ORAL at 11:57

## 2023-07-19 NOTE — ED ADULT NURSE NOTE - CHIEF COMPLAINT QUOTE
CP x5days  states last drink 3-4 days ago  HX HTN, poor heart beat?  Seen at [facility] yesterday but states pain much worse

## 2023-07-19 NOTE — H&P ADULT - PROBLEM SELECTOR PLAN 1
present with abd pain, alcohol use  Lipase elevated at 111  CT abd/pelvis with prominent CBD 9.5mm, similar to prior CT back in 2021. Recent abd ultrasound with normal CBD. ? intermittent obstruction  NPO, IV fluid, pain control, IV morphine for severe pain  Oral PPI  GI consulted- Dr Young  Will need outpatient MRCP and GI follow up  Alcohol cessation education

## 2023-07-19 NOTE — ED PROVIDER NOTE - PHYSICAL EXAMINATION
General: Well appearing male in no acute distress, not tremulous  HEENT: Normocephalic, atraumatic. Moist mucous membranes. Oropharynx clear. No lymphadenopathy.  Eyes: No scleral icterus. EOMI. TIMMY.  Neck:. Soft and supple. Full ROM without pain. No midline tenderness  Cardiac: Regular rate and regular rhythm. No murmurs, rubs, gallops. Peripheral pulses 2+ and symmetric. No LE edema.  Resp: Lungs CTAB. Speaking in full sentences. No wheezes, rales or rhonchi.  Abd: Soft, +tender epigastric region, non-distended. No guarding or rebound. No scars, masses, or lesions.  Back: Spine midline and non-tender. No CVA tenderness.    Skin: No rashes, abrasions, or lacerations.  Neuro: AO x 3. Moves all extremities symmetrically. Motor strength and sensation grossly intact.

## 2023-07-19 NOTE — H&P ADULT - NSHPPHYSICALEXAM_GEN_ALL_CORE
CONSTITUTIONAL: alert and cooperative, mild distress due to pain  EYES: PERRL, no scleral icterus  ENT: Mucosa moist, tongue normal  NECK: Neck supple, trachea midline, non-tender  CARDIAC: Normal S1 and S2. Regular rate and rhythms. No Pedal edema. Peripheral pulses intact  LUNGS: Equal air entry both lungs. No rales, rhonchi, wheezing. Normal respiratory effort.   ABDOMEN: Upper abd ( mid/upper abd, LUQ pain). No guarding or rebound tenderness. No hepatomegaly or splenomegaly. Bowel sound normal.   MUSCULOSKELETAL: Normocephalic, atraumatic. No significant deformity or joint abnormality  NEUROLOGICAL: No gross motor or sensory deficits  SKIN: no lesions or eruptions. Normal turgor  PSYCHIATRIC: A&O x 3, appropriate mood and affect

## 2023-07-19 NOTE — ED ADULT TRIAGE NOTE - CHIEF COMPLAINT QUOTE
CP x5days  states last drink 3-4 days ago  HX HTN, poor heart beat? CP x5days  states last drink 3-4 days ago  HX HTN, poor heart beat?  Seen at [facility] yesterday but states pain much worse

## 2023-07-19 NOTE — H&P ADULT - ASSESSMENT
43 years old female with h/o HTN, HLD, alcohol use, h/o pancreatitis present to Ed wt complain of abdominal pain. Patient reported severe abd pain for last 6 days, worsened over last 2 days, associated with nausea and multiple episode of nonbloody vomiting. No diarrhea.   Hemodynamically stable, afebrile, sat well at RA. WBC 13.79, plt 260, K 3.5, Cr 1.14, [id number], AST//337, hsTnT 4.8. CT abd/pelvis with prominent CBD 9.5mm, similar to prior CT back in 2021. Recent abd ultrasound with normal CBD.     Admitted with acute pancreatitis

## 2023-07-19 NOTE — H&P ADULT - HISTORY OF PRESENT ILLNESS
43 years old female with h/o HTN, HLD, alcohol use, h/o pancreatitis present to Ed wt complain of abdominal pain. Patient reported severe abd pain for last 6 days, worsened over last 2 days, associated with nausea and multiple episode of nonbloody vomiting. No diarrhea.   Hemodynamically stable, afebrile, sat well at RA. WBC 13.79, plt 260, K 3.5, Cr 1.14, [id number], AST//337, hsTnT 4.8. CT abd/pelvis with prominent CBD 9.5mm, similar to prior CT back in 2021. Recent abd ultrasound with normal CBD.     SH: alcohol use

## 2023-07-19 NOTE — ED PROVIDER NOTE - DISPOSITION TYPE
EXAMINATION: CT cervical spine

 

HISTORY: Pain

 

COMPARISON: None

 

TECHNIQUE: Axial CT images obtained through the cervical spine without contrast. Coronal and sagittal
 reconstructions obtained.

 

FINDINGS: The cervical spinal alignment is normal. The vertebral body heights and disc spaces appear 
well-maintained. There is no fracture or acute osseous abnormality demonstrated. Bone mineralization 
is normal. Early marginal osteophytes noted. Paravertebral soft tissues appear normal. The lung apice
s are clear.

 

IMPRESSION: No acute cervical spinal abnormality identified. ADMIT

## 2023-07-19 NOTE — ED PROVIDER NOTE - CLINICAL SUMMARY MEDICAL DECISION MAKING FREE TEXT BOX
44 y/o M hx of etoh abuse/withdrawal, pancreatitis presents for nausea/vomiting. no signs of etoh withdrawal today. epigastric abdominal tenderness. unable to tolerate PO for past 1 day per patient.     admit for pancreatitis, lipase significantly elevated, no gallstones seen on US. lfts elevated compared to baseline, will get ct abd/pelvis, medicine aware, will admit. 44 y/o M hx of etoh abuse/withdrawal, pancreatitis presents for nausea/vomiting. no signs of etoh withdrawal today. epigastric abdominal tenderness. unable to tolerate PO for past 1 day per patient.   no signs of etoh withdrawal currently, last drink several days ago per patient. well appearing. wife at bedside to provide collateral.     admit for pancreatitis, lipase significantly elevated, no gallstones seen on US. lfts elevated compared to baseline, will get ct abd/pelvis, medicine aware, will admit.

## 2023-07-19 NOTE — ED ADULT NURSE NOTE - NSFALLCONCLUSION_ED_ALL_ED
-Dry gangrene noted over distal toes on bilateral lower extremities--most concerning for microvascular disease subsequent to vasopressor administration   -Dorsalis pedis pulses intact bilaterally  - ERASMO demonstrating right ERASMO: 1.09, left ERASMO: 1.24, right TBI: 0.78, and left TBI: 0.73.   Significant lower extremity arterial disease not identified. Decreased amplitude to the pulse volume recordings at the levels of the toes bilaterally.  - Podiatry recs appreciated, no acute surgical intervention at this time, applying betadine to wound  - Wound care recs appreciated  - vascular recs appreciated Universal Safety Interventions

## 2023-07-19 NOTE — ED ADULT NURSE NOTE - NSFALLUNIVINTERV_ED_ALL_ED
Bed/Stretcher in lowest position, wheels locked, appropriate side rails in place/Call bell, personal items and telephone in reach/Instruct patient to call for assistance before getting out of bed/chair/stretcher/Non-slip footwear applied when patient is off stretcher/[place] to call system/Physically safe environment - no spills, clutter or unnecessary equipment/Purposeful proactive rounding/Room/bathroom lighting operational, light cord in reach

## 2023-07-19 NOTE — ED ADULT NURSE NOTE - OBJECTIVE STATEMENT
Patient A&Ox4, speaking full sentences presents to ED c/o intermittent chest pain x 5-6 days. As per patient he used to drink 6 days a week but has not been drinking for the last 2 months. As per patient he started drinking again 5-6 days ago when these symptoms began. Patient endorsing mid sternal chest pain rated 9/10 and mild SOB that is worse with exertion. Patient denies any recent heavy lifting or exercise.     CP x5days  states last drink 3-4 days ago  HX HTN, poor heart beat?  Seen at [facility] yesterday but states pain much worse Patient A&Ox4, speaking full sentences presents to ED c/o intermittent chest pain x 5-6 days. As per patient he used to drink 6 days a week but has not been drinking for the last 2 months. As per patient he started drinking again 6 days ago when these symptoms began. Last drink 5 days ago. Patient endorsing mid sternal chest pain rated 9/10 and mild SOB that is worse with exertion. Patient also reports N/V/D x 5 days. Patient denies any recent heavy lifting or exercise. Patient denies f/c, headache, tremors, hallucinations, diaphoresis, numbness, tingling, dizziness. PMH HTN, NKDA. Of note, patient seen in this ED yesterday; discharged with medications but has had no relief and worsening of symptoms. Patient placed on cardiac monitor. IV access obtained. Patient A&Ox4, speaking full sentences presents to ED c/o intermittent chest pain x 5-6 days. As per patient he used to drink 6 days a week but has not been drinking for the last 2 months. As per patient he started drinking again 6 days ago when these symptoms began. Last drink 5 days ago. Patient endorsing mid sternal chest pain rated 9/10 and mild SOB that is worse with exertion. Patient also reports N/V/D x 5 days. Patient denies any recent heavy lifting or exercise. Patient denies f/c, tremors, hallucinations, diaphoresis, numbness, tingling, dizziness. PMH HTN, NKDA. Of note, patient seen in this ED yesterday; discharged with medications but has had no relief and worsening of symptoms. Patient placed on cardiac monitor. IV access obtained.

## 2023-07-19 NOTE — ED PROVIDER NOTE - OBJECTIVE STATEMENT
44 y/o M hx of alcohol abuse/withdrawal, pancreatitis presents w/ nausea/vomiting for past 1 day. endorsing epigastric abdominal pain. states last etoh drink was several days ago. unable to tolerate PO for the past 1 day per patient and wife at the bedside. currently denies chest pain/sob. denies fever/chills. denies visual/auditory hallucinations. denies feeling tremulous. denies changes in bowel movements.

## 2023-07-19 NOTE — ED ADULT NURSE NOTE - SIGNIFICANT NEGATIVE FINDINGS
denies f/c, headache, tremors, hallucinations, diaphoresis, numbness, tingling, dizziness  no radiation of pain denies f/c, tremors, hallucinations, diaphoresis, numbness, tingling, dizziness  no radiation of pain

## 2023-07-19 NOTE — PATIENT PROFILE ADULT - FUNCTIONAL ASSESSMENT - BASIC MOBILITY 6.
3-calculated by average/Not able to assess (calculate score using [facility] averaging method)

## 2023-07-19 NOTE — SBIRT NOTE ADULT - NSSBIRTALCPASSREFTXDET_GEN_A_CORE
Provided SBIRT services: Full screen positive. Referral to Treatment attempted. Screening results were reviewed with the patient and patient was provided information about healthy guidelines and potential negative consequences associated with level of risk. Motivation and readiness to reduce or stop use was discussed and goals and activities to make changes were suggested/offered. Options discussed for further evaluation and treatment, but referral to treatment was not completed because: Patient declined. States he will cut down on his own as he has done it before. Reports that prior to this weekend, he stopped for 2 months. Declined peer support and referrals.

## 2023-07-19 NOTE — PATIENT PROFILE ADULT - FALL HARM RISK - RISK INTERVENTIONS

## 2023-07-19 NOTE — PATIENT PROFILE ADULT - PATIENT REPRESENTATIVE: ( YOU CAN CHOOSE ANY PERSON THAT CAN ASSIST YOU WITH YOUR HEALTH CARE PREFERENCES, DOES NOT HAVE TO BE A SPOUSE, IMMEDIATE FAMILY OR SIGNIFICANT OTHER/PARTNER)
HYDROXYCHLOROQUINE (PLAQUENIL) USE: NO OCULAR TOXICITY OU. CONTINUE PLAQUENIL AS DIRECTED. SCHEDULE YEARLY VF 10-2 RED,OCT MACULA, AND ASSESMENT OF COLOR PLATES WITH DILATED FUNDUS EXAM. KEEP FOLLOW UP AS SCHEDULED. declines

## 2023-07-20 LAB
ALBUMIN SERPL ELPH-MCNC: 3.1 G/DL — LOW (ref 3.3–5)
ALP SERPL-CCNC: 135 U/L — HIGH (ref 40–120)
ALT FLD-CCNC: 205 U/L — HIGH (ref 12–78)
ANION GAP SERPL CALC-SCNC: 5 MMOL/L — SIGNIFICANT CHANGE UP (ref 5–17)
AST SERPL-CCNC: 129 U/L — HIGH (ref 15–37)
BILIRUB SERPL-MCNC: 2.1 MG/DL — HIGH (ref 0.2–1.2)
BUN SERPL-MCNC: 15 MG/DL — SIGNIFICANT CHANGE UP (ref 7–23)
CALCIUM SERPL-MCNC: 8.5 MG/DL — SIGNIFICANT CHANGE UP (ref 8.5–10.1)
CHLORIDE SERPL-SCNC: 103 MMOL/L — SIGNIFICANT CHANGE UP (ref 96–108)
CO2 SERPL-SCNC: 27 MMOL/L — SIGNIFICANT CHANGE UP (ref 22–31)
CREAT SERPL-MCNC: 1.08 MG/DL — SIGNIFICANT CHANGE UP (ref 0.5–1.3)
EGFR: 87 ML/MIN/1.73M2 — SIGNIFICANT CHANGE UP
GLUCOSE SERPL-MCNC: 113 MG/DL — HIGH (ref 70–99)
HCT VFR BLD CALC: 42.3 % — SIGNIFICANT CHANGE UP (ref 39–50)
HGB BLD-MCNC: 14.6 G/DL — SIGNIFICANT CHANGE UP (ref 13–17)
LIDOCAIN IGE QN: 758 U/L — HIGH (ref 73–393)
MAGNESIUM SERPL-MCNC: 1.9 MG/DL — SIGNIFICANT CHANGE UP (ref 1.6–2.6)
MCHC RBC-ENTMCNC: 28.1 PG — SIGNIFICANT CHANGE UP (ref 27–34)
MCHC RBC-ENTMCNC: 34.5 G/DL — SIGNIFICANT CHANGE UP (ref 32–36)
MCV RBC AUTO: 81.5 FL — SIGNIFICANT CHANGE UP (ref 80–100)
NRBC # BLD: 0 /100 WBCS — SIGNIFICANT CHANGE UP (ref 0–0)
PHOSPHATE SERPL-MCNC: 2.8 MG/DL — SIGNIFICANT CHANGE UP (ref 2.5–4.5)
PLATELET # BLD AUTO: 226 K/UL — SIGNIFICANT CHANGE UP (ref 150–400)
POTASSIUM SERPL-MCNC: 3.7 MMOL/L — SIGNIFICANT CHANGE UP (ref 3.5–5.3)
POTASSIUM SERPL-SCNC: 3.7 MMOL/L — SIGNIFICANT CHANGE UP (ref 3.5–5.3)
PROT SERPL-MCNC: 7 GM/DL — SIGNIFICANT CHANGE UP (ref 6–8.3)
RBC # BLD: 5.19 M/UL — SIGNIFICANT CHANGE UP (ref 4.2–5.8)
RBC # FLD: 12.6 % — SIGNIFICANT CHANGE UP (ref 10.3–14.5)
SODIUM SERPL-SCNC: 135 MMOL/L — SIGNIFICANT CHANGE UP (ref 135–145)
WBC # BLD: 13.53 K/UL — HIGH (ref 3.8–10.5)
WBC # FLD AUTO: 13.53 K/UL — HIGH (ref 3.8–10.5)

## 2023-07-20 PROCEDURE — 99232 SBSQ HOSP IP/OBS MODERATE 35: CPT

## 2023-07-20 RX ORDER — SODIUM CHLORIDE 9 MG/ML
1000 INJECTION, SOLUTION INTRAVENOUS
Refills: 0 | Status: DISCONTINUED | OUTPATIENT
Start: 2023-07-20 | End: 2023-07-24

## 2023-07-20 RX ADMIN — Medication 50 MILLIGRAM(S): at 05:40

## 2023-07-20 RX ADMIN — PANTOPRAZOLE SODIUM 40 MILLIGRAM(S): 20 TABLET, DELAYED RELEASE ORAL at 05:40

## 2023-07-20 RX ADMIN — Medication 1 MILLIGRAM(S): at 11:36

## 2023-07-20 RX ADMIN — AMLODIPINE BESYLATE 10 MILLIGRAM(S): 2.5 TABLET ORAL at 05:40

## 2023-07-20 RX ADMIN — Medication 100 MILLIGRAM(S): at 13:41

## 2023-07-20 RX ADMIN — Medication 1 TABLET(S): at 11:36

## 2023-07-20 NOTE — PROGRESS NOTE ADULT - SUBJECTIVE AND OBJECTIVE BOX
Pt feeling better today  No c/o abd pain    Lipase: 758 U/L (07.20.23 @ 06:52)      MEDICATIONS  (STANDING):  amLODIPine   Tablet 10 milliGRAM(s) Oral daily  folic acid 1 milliGRAM(s) Oral daily  lactated ringers. 1000 milliLiter(s) (175 mL/Hr) IV Continuous <Continuous>  metoprolol succinate ER 50 milliGRAM(s) Oral daily  multivitamin 1 Tablet(s) Oral daily  pantoprazole    Tablet 40 milliGRAM(s) Oral before breakfast  thiamine 100 milliGRAM(s) Oral daily    MEDICATIONS  (PRN):  aluminum hydroxide/magnesium hydroxide/simethicone Suspension 30 milliLiter(s) Oral every 4 hours PRN Dyspepsia  melatonin 3 milliGRAM(s) Oral at bedtime PRN Insomnia  morphine  - Injectable 2 milliGRAM(s) IV Push every 4 hours PRN Severe Pain (7 - 10)  ondansetron Injectable 4 milliGRAM(s) IV Push every 8 hours PRN Nausea and/or Vomiting  oxyCODONE    IR 5 milliGRAM(s) Oral every 6 hours PRN Moderate Pain (4 - 6)      Allergies    No Known Allergies    Intolerances        Vital Signs Last 24 Hrs  T(C): 36.7 (20 Jul 2023 05:33), Max: 37.1 (19 Jul 2023 17:07)  T(F): 98 (20 Jul 2023 05:33), Max: 98.8 (19 Jul 2023 17:07)  HR: 96 (20 Jul 2023 05:33) (78 - 96)  BP: 118/78 (20 Jul 2023 05:33) (118/78 - 143/104)  BP(mean): --  RR: 18 (20 Jul 2023 05:33) (17 - 18)  SpO2: 96% (20 Jul 2023 05:33) (96% - 100%)    Parameters below as of 20 Jul 2023 05:33  Patient On (Oxygen Delivery Method): room air        PHYSICAL EXAM:  General: NAD.  CVS: S1, S2  Chest: air entry bilaterally present  Abd: BS present, soft, non-tender      LABS:                        14.6   13.53 )-----------( 226      ( 20 Jul 2023 06:52 )             42.3     07-20    135  |  103  |  15  ----------------------------<  113<H>  3.7   |  27  |  1.08    Ca    8.5      20 Jul 2023 06:52  Phos  2.8     07-20  Mg     1.9     07-20    TPro  7.0  /  Alb  3.1<L>  /  TBili  2.1<H>  /  DBili  x   /  AST  129<H>  /  ALT  205<H>  /  AlkPhos  135<H>  07-20        start clear liquids  Lipase in AM  will need outpatient MRI - machine in hospital is out of order

## 2023-07-20 NOTE — PROGRESS NOTE ADULT - SUBJECTIVE AND OBJECTIVE BOX
CHIEF COMPLAINT/INTERVAL HISTORY:    Patient is a 43y old  Male who presents with a chief complaint of acute pancreatitis (20 Jul 2023 09:09)      HPI:  43 years old female with h/o HTN, HLD, alcohol use, h/o pancreatitis present to Ed wt complain of abdominal pain. Patient reported severe abd pain for last 6 days, worsened over last 2 days, associated with nausea and multiple episode of nonbloody vomiting. No diarrhea.   Hemodynamically stable, afebrile, sat well at RA. WBC 13.79, plt 260, K 3.5, Cr 1.14, [id number], AST//337, hsTnT 4.8. CT abd/pelvis with prominent CBD 9.5mm, similar to prior CT back in 2021. Recent abd ultrasound with normal CBD.     SH: alcohol use (19 Jul 2023 13:48)      SUBJECTIVE & OBJECTIVE: Pt seen and examined at bedside  denies CP/ SOB/ ab dpain/ N/V/D      Vital Signs Last 24 Hrs  T(C): 36.7 (20 Jul 2023 05:33), Max: 37.1 (19 Jul 2023 17:07)  T(F): 98 (20 Jul 2023 05:33), Max: 98.8 (19 Jul 2023 17:07)  HR: 96 (20 Jul 2023 05:33) (80 - 96)  BP: 118/78 (20 Jul 2023 05:33) (118/78 - 142/94)  BP(mean): --  ABP: --  ABP(mean): --  RR: 18 (20 Jul 2023 05:33) (17 - 18)  SpO2: 96% (20 Jul 2023 05:33) (96% - 100%)    O2 Parameters below as of 20 Jul 2023 05:33  Patient On (Oxygen Delivery Method): room air              MEDICATIONS  (STANDING):  amLODIPine   Tablet 10 milliGRAM(s) Oral daily  folic acid 1 milliGRAM(s) Oral daily  metoprolol succinate ER 50 milliGRAM(s) Oral daily  multivitamin 1 Tablet(s) Oral daily  pantoprazole    Tablet 40 milliGRAM(s) Oral before breakfast  thiamine 100 milliGRAM(s) Oral daily    MEDICATIONS  (PRN):  aluminum hydroxide/magnesium hydroxide/simethicone Suspension 30 milliLiter(s) Oral every 4 hours PRN Dyspepsia  melatonin 3 milliGRAM(s) Oral at bedtime PRN Insomnia  morphine  - Injectable 2 milliGRAM(s) IV Push every 4 hours PRN Severe Pain (7 - 10)  ondansetron Injectable 4 milliGRAM(s) IV Push every 8 hours PRN Nausea and/or Vomiting  oxyCODONE    IR 5 milliGRAM(s) Oral every 6 hours PRN Moderate Pain (4 - 6)        PHYSICAL EXAM:    GENERAL: NAD, well-groomed, well-developed  HEAD:  Atraumatic, Normocephalic  EYES: EOMI, PERRLA, conjunctiva and sclera clear  ENMT: Moist mucous membranes  NECK: Supple, No JVD  NERVOUS SYSTEM:  Alert & Oriented X3, Motor Strength 5/5 B/L upper and lower extremities; DTRs 2+ intact and symmetric  CHEST/LUNG: Clear to auscultation bilaterally; No rales, rhonchi, wheezing, or rubs  HEART: Regular rate and rhythm; No murmurs, rubs, or gallops  ABDOMEN: Soft, Nontender, Nondistended; Bowel sounds present  EXTREMITIES:  2+ Peripheral Pulses, No clubbing, cyanosis, or edema    LABS:                        14.6   13.53 )-----------( 226      ( 20 Jul 2023 06:52 )             42.3     07-20    135  |  103  |  15  ----------------------------<  113<H>  3.7   |  27  |  1.08    Ca    8.5      20 Jul 2023 06:52  Phos  2.8     07-20  Mg     1.9     07-20    TPro  7.0  /  Alb  3.1<L>  /  TBili  2.1<H>  /  DBili  x   /  AST  129<H>  /  ALT  205<H>  /  AlkPhos  135<H>  07-20      Urinalysis Basic - ( 20 Jul 2023 06:52 )    Color: x / Appearance: x / SG: x / pH: x  Gluc: 113 mg/dL / Ketone: x  / Bili: x / Urobili: x   Blood: x / Protein: x / Nitrite: x   Leuk Esterase: x / RBC: x / WBC x   Sq Epi: x / Non Sq Epi: x / Bacteria: x     CHIEF COMPLAINT/INTERVAL HISTORY:    Patient is a 43y old  Male who presents with a chief complaint of acute pancreatitis (20 Jul 2023 09:09)      HPI:  43 years old female with h/o HTN, HLD, alcohol use, h/o pancreatitis present to Ed wt complain of abdominal pain. Patient reported severe abd pain for last 6 days, worsened over last 2 days, associated with nausea and multiple episode of nonbloody vomiting. No diarrhea.   Hemodynamically stable, afebrile, sat well at RA. WBC 13.79, plt 260, K 3.5, Cr 1.14, [id number], AST//337, hsTnT 4.8. CT abd/pelvis with prominent CBD 9.5mm, similar to prior CT back in 2021. Recent abd ultrasound with normal CBD.     SH: alcohol use (19 Jul 2023 13:48)      SUBJECTIVE & OBJECTIVE: Pt seen and examined at bedside  denies CP/ SOB/ abd pain/ N/V/D      Vital Signs Last 24 Hrs  T(C): 36.7 (20 Jul 2023 05:33), Max: 37.1 (19 Jul 2023 17:07)  T(F): 98 (20 Jul 2023 05:33), Max: 98.8 (19 Jul 2023 17:07)  HR: 96 (20 Jul 2023 05:33) (80 - 96)  BP: 118/78 (20 Jul 2023 05:33) (118/78 - 142/94)  BP(mean): --  ABP: --  ABP(mean): --  RR: 18 (20 Jul 2023 05:33) (17 - 18)  SpO2: 96% (20 Jul 2023 05:33) (96% - 100%)    O2 Parameters below as of 20 Jul 2023 05:33  Patient On (Oxygen Delivery Method): room air              MEDICATIONS  (STANDING):  amLODIPine   Tablet 10 milliGRAM(s) Oral daily  folic acid 1 milliGRAM(s) Oral daily  metoprolol succinate ER 50 milliGRAM(s) Oral daily  multivitamin 1 Tablet(s) Oral daily  pantoprazole    Tablet 40 milliGRAM(s) Oral before breakfast  thiamine 100 milliGRAM(s) Oral daily    MEDICATIONS  (PRN):  aluminum hydroxide/magnesium hydroxide/simethicone Suspension 30 milliLiter(s) Oral every 4 hours PRN Dyspepsia  melatonin 3 milliGRAM(s) Oral at bedtime PRN Insomnia  morphine  - Injectable 2 milliGRAM(s) IV Push every 4 hours PRN Severe Pain (7 - 10)  ondansetron Injectable 4 milliGRAM(s) IV Push every 8 hours PRN Nausea and/or Vomiting  oxyCODONE    IR 5 milliGRAM(s) Oral every 6 hours PRN Moderate Pain (4 - 6)        PHYSICAL EXAM:    GENERAL: NAD, well-developed  HEAD:  Atraumatic, Normocephalic  EYES: EOMI, PERRLA, conjunctiva and sclera clear  ENMT: Moist mucous membranes  NECK: Supple,  NERVOUS SYSTEM:  Alert & Oriented X3, Motor Strength 5/5 B/L upper and lower extremities  CHEST/LUNG: Clear to auscultation bilaterally; No rales, rhonchi, wheezing, or rubs  HEART: Regular rate and rhythm  ABDOMEN: Soft, Nontender, Nondistended; Bowel sounds present  EXTREMITIES: no cyanosis, or edema    LABS:                        14.6   13.53 )-----------( 226      ( 20 Jul 2023 06:52 )             42.3     07-20    135  |  103  |  15  ----------------------------<  113<H>  3.7   |  27  |  1.08    Ca    8.5      20 Jul 2023 06:52  Phos  2.8     07-20  Mg     1.9     07-20    TPro  7.0  /  Alb  3.1<L>  /  TBili  2.1<H>  /  DBili  x   /  AST  129<H>  /  ALT  205<H>  /  AlkPhos  135<H>  07-20      Urinalysis Basic - ( 20 Jul 2023 06:52 )    Color: x / Appearance: x / SG: x / pH: x  Gluc: 113 mg/dL / Ketone: x  / Bili: x / Urobili: x   Blood: x / Protein: x / Nitrite: x   Leuk Esterase: x / RBC: x / WBC x   Sq Epi: x / Non Sq Epi: x / Bacteria: x

## 2023-07-21 LAB
ANION GAP SERPL CALC-SCNC: 2 MMOL/L — LOW (ref 5–17)
BUN SERPL-MCNC: 9 MG/DL — SIGNIFICANT CHANGE UP (ref 7–23)
C DIFF BY PCR RESULT: SIGNIFICANT CHANGE UP
CALCIUM SERPL-MCNC: 8.8 MG/DL — SIGNIFICANT CHANGE UP (ref 8.5–10.1)
CHLORIDE SERPL-SCNC: 111 MMOL/L — HIGH (ref 96–108)
CO2 SERPL-SCNC: 28 MMOL/L — SIGNIFICANT CHANGE UP (ref 22–31)
CREAT SERPL-MCNC: 0.93 MG/DL — SIGNIFICANT CHANGE UP (ref 0.5–1.3)
EGFR: 104 ML/MIN/1.73M2 — SIGNIFICANT CHANGE UP
GLUCOSE SERPL-MCNC: 147 MG/DL — HIGH (ref 70–99)
HCT VFR BLD CALC: 41.6 % — SIGNIFICANT CHANGE UP (ref 39–50)
HGB BLD-MCNC: 14.5 G/DL — SIGNIFICANT CHANGE UP (ref 13–17)
LIDOCAIN IGE QN: 1257 U/L — HIGH (ref 73–393)
MAGNESIUM SERPL-MCNC: 2.2 MG/DL — SIGNIFICANT CHANGE UP (ref 1.6–2.6)
MCHC RBC-ENTMCNC: 28.4 PG — SIGNIFICANT CHANGE UP (ref 27–34)
MCHC RBC-ENTMCNC: 34.9 G/DL — SIGNIFICANT CHANGE UP (ref 32–36)
MCV RBC AUTO: 81.6 FL — SIGNIFICANT CHANGE UP (ref 80–100)
NRBC # BLD: 0 /100 WBCS — SIGNIFICANT CHANGE UP (ref 0–0)
PHOSPHATE SERPL-MCNC: 2.3 MG/DL — LOW (ref 2.5–4.5)
PLATELET # BLD AUTO: 228 K/UL — SIGNIFICANT CHANGE UP (ref 150–400)
POTASSIUM SERPL-MCNC: 4.4 MMOL/L — SIGNIFICANT CHANGE UP (ref 3.5–5.3)
POTASSIUM SERPL-SCNC: 4.4 MMOL/L — SIGNIFICANT CHANGE UP (ref 3.5–5.3)
RBC # BLD: 5.1 M/UL — SIGNIFICANT CHANGE UP (ref 4.2–5.8)
RBC # FLD: 12.9 % — SIGNIFICANT CHANGE UP (ref 10.3–14.5)
SODIUM SERPL-SCNC: 141 MMOL/L — SIGNIFICANT CHANGE UP (ref 135–145)
WBC # BLD: 8.6 K/UL — SIGNIFICANT CHANGE UP (ref 3.8–10.5)
WBC # FLD AUTO: 8.6 K/UL — SIGNIFICANT CHANGE UP (ref 3.8–10.5)

## 2023-07-21 RX ADMIN — SODIUM CHLORIDE 100 MILLILITER(S): 9 INJECTION, SOLUTION INTRAVENOUS at 19:16

## 2023-07-21 RX ADMIN — Medication 1 MILLIGRAM(S): at 15:55

## 2023-07-21 RX ADMIN — PANTOPRAZOLE SODIUM 40 MILLIGRAM(S): 20 TABLET, DELAYED RELEASE ORAL at 05:20

## 2023-07-21 RX ADMIN — SODIUM CHLORIDE 100 MILLILITER(S): 9 INJECTION, SOLUTION INTRAVENOUS at 05:21

## 2023-07-21 RX ADMIN — Medication 1 TABLET(S): at 15:55

## 2023-07-21 RX ADMIN — Medication 50 MILLIGRAM(S): at 05:19

## 2023-07-21 RX ADMIN — Medication 100 MILLIGRAM(S): at 15:55

## 2023-07-21 NOTE — PROGRESS NOTE ADULT - SUBJECTIVE AND OBJECTIVE BOX
Pt feeling better  no c/o abd pain  Pt with rise in lipase    Lipase: 1257 U/L (07.21.23 @ 07:06)          MEDICATIONS  (STANDING):  amLODIPine   Tablet 10 milliGRAM(s) Oral daily  folic acid 1 milliGRAM(s) Oral daily  lactated ringers. 1000 milliLiter(s) (100 mL/Hr) IV Continuous <Continuous>  metoprolol succinate ER 50 milliGRAM(s) Oral daily  multivitamin 1 Tablet(s) Oral daily  pantoprazole    Tablet 40 milliGRAM(s) Oral before breakfast  thiamine 100 milliGRAM(s) Oral daily    MEDICATIONS  (PRN):  aluminum hydroxide/magnesium hydroxide/simethicone Suspension 30 milliLiter(s) Oral every 4 hours PRN Dyspepsia  melatonin 3 milliGRAM(s) Oral at bedtime PRN Insomnia  morphine  - Injectable 2 milliGRAM(s) IV Push every 4 hours PRN Severe Pain (7 - 10)  ondansetron Injectable 4 milliGRAM(s) IV Push every 8 hours PRN Nausea and/or Vomiting  oxyCODONE    IR 5 milliGRAM(s) Oral every 6 hours PRN Moderate Pain (4 - 6)      Allergies    No Known Allergies    Intolerances        Vital Signs Last 24 Hrs  T(C): 36.7 (21 Jul 2023 11:42), Max: 36.7 (20 Jul 2023 17:29)  T(F): 98 (21 Jul 2023 11:42), Max: 98.1 (21 Jul 2023 05:13)  HR: 87 (21 Jul 2023 11:42) (86 - 112)  BP: 128/89 (21 Jul 2023 11:42) (100/67 - 128/89)  BP(mean): --  RR: 18 (21 Jul 2023 11:42) (18 - 18)  SpO2: 100% (21 Jul 2023 11:42) (98% - 100%)    Parameters below as of 21 Jul 2023 05:13  Patient On (Oxygen Delivery Method): room air        PHYSICAL EXAM:  General: NAD.  CVS: S1, S2  Chest: air entry bilaterally present  Abd: BS present, soft, non-tender      LABS:                        14.5   8.60  )-----------( 228      ( 21 Jul 2023 07:06 )             41.6     07-21    141  |  111<H>  |  9   ----------------------------<  147<H>  4.4   |  28  |  0.93    Ca    8.8      21 Jul 2023 07:06  Phos  2.3     07-21  Mg     2.2     07-21    TPro  7.0  /  Alb  3.1<L>  /  TBili  2.1<H>  /  DBili  x   /  AST  129<H>  /  ALT  205<H>  /  AlkPhos  135<H>  07-20      would continue NPO today and resume fluids tomorrow if lipase level comes down  Lipase in AM

## 2023-07-21 NOTE — PROGRESS NOTE ADULT - SUBJECTIVE AND OBJECTIVE BOX
CHIEF COMPLAINT/INTERVAL HISTORY:    Patient is a 43y old  Male who presents with a chief complaint of acute pancreatitis (20 Jul 2023 09:09)      HPI:  43 years old female with h/o HTN, HLD, alcohol use, h/o pancreatitis present to Ed wt complain of abdominal pain. Patient reported severe abd pain for last 6 days, worsened over last 2 days, associated with nausea and multiple episode of nonbloody vomiting. No diarrhea.   Hemodynamically stable, afebrile, sat well at RA. WBC 13.79, plt 260, K 3.5, Cr 1.14, [id number], AST//337, hsTnT 4.8. CT abd/pelvis with prominent CBD 9.5mm, similar to prior CT back in 2021. Recent abd ultrasound with normal CBD.     SH: alcohol use (19 Jul 2023 13:48)      SUBJECTIVE & OBJECTIVE: Pt seen and examined at bedside  denies CP/ SOB/ abd pain/ N/V/D      T(C): 36.6 (07-21-23 @ 15:40), Max: 36.7 (07-21-23 @ 11:42)  HR: 87 (07-21-23 @ 15:40) (87 - 87)  BP: 129/89 (07-21-23 @ 15:40) (128/89 - 129/89)  RR: 18 (07-21-23 @ 15:40) (18 - 18)  SpO2: 98% (07-21-23 @ 15:40) (98% - 100%)      MEDICATIONS  (STANDING):  amLODIPine   Tablet 10 milliGRAM(s) Oral daily  folic acid 1 milliGRAM(s) Oral daily  lactated ringers. 1000 milliLiter(s) (100 mL/Hr) IV Continuous <Continuous>  metoprolol succinate ER 50 milliGRAM(s) Oral daily  multivitamin 1 Tablet(s) Oral daily  pantoprazole    Tablet 40 milliGRAM(s) Oral before breakfast  thiamine 100 milliGRAM(s) Oral daily    MEDICATIONS  (PRN):  aluminum hydroxide/magnesium hydroxide/simethicone Suspension 30 milliLiter(s) Oral every 4 hours PRN Dyspepsia  melatonin 3 milliGRAM(s) Oral at bedtime PRN Insomnia  morphine  - Injectable 2 milliGRAM(s) IV Push every 4 hours PRN Severe Pain (7 - 10)  ondansetron Injectable 4 milliGRAM(s) IV Push every 8 hours PRN Nausea and/or Vomiting  oxyCODONE    IR 5 milliGRAM(s) Oral every 6 hours PRN Moderate Pain (4 - 6)          MEDICATIONS  (STANDING):  amLODIPine   Tablet 10 milliGRAM(s) Oral daily  folic acid 1 milliGRAM(s) Oral daily  metoprolol succinate ER 50 milliGRAM(s) Oral daily  multivitamin 1 Tablet(s) Oral daily  pantoprazole    Tablet 40 milliGRAM(s) Oral before breakfast  thiamine 100 milliGRAM(s) Oral daily    MEDICATIONS  (PRN):  aluminum hydroxide/magnesium hydroxide/simethicone Suspension 30 milliLiter(s) Oral every 4 hours PRN Dyspepsia  melatonin 3 milliGRAM(s) Oral at bedtime PRN Insomnia  morphine  - Injectable 2 milliGRAM(s) IV Push every 4 hours PRN Severe Pain (7 - 10)  ondansetron Injectable 4 milliGRAM(s) IV Push every 8 hours PRN Nausea and/or Vomiting  oxyCODONE    IR 5 milliGRAM(s) Oral every 6 hours PRN Moderate Pain (4 - 6)        PHYSICAL EXAM:    GENERAL: NAD, well-developed  HEAD:  Atraumatic, Normocephalic  EYES: EOMI, PERRLA, conjunctiva and sclera clear  ENMT: Moist mucous membranes  NECK: Supple,  NERVOUS SYSTEM:  Alert & Oriented X3, Motor Strength 5/5 B/L upper and lower extremities  CHEST/LUNG: Clear to auscultation bilaterally; No rales, rhonchi, wheezing, or rubs  HEART: Regular rate and rhythm  ABDOMEN: Soft, Nontender, Nondistended; Bowel sounds present  EXTREMITIES: no cyanosis, or edema                          14.5   8.60  )-----------( 228      ( 21 Jul 2023 07:06 )             41.6           LIVER FUNCTIONS - ( 20 Jul 2023 06:52 )  Alb: 3.1 g/dL / Pro: 7.0 gm/dL / ALK PHOS: 135 U/L / ALT: 205 U/L / AST: 129 U/L / GGT: x             141|111|9<147  4.4|28|0.93  8.8,2.2,2.3  07-21 @ 07:06

## 2023-07-22 LAB — LIDOCAIN IGE QN: 1505 U/L — HIGH (ref 73–393)

## 2023-07-22 RX ADMIN — Medication 1 MILLIGRAM(S): at 11:51

## 2023-07-22 RX ADMIN — PANTOPRAZOLE SODIUM 40 MILLIGRAM(S): 20 TABLET, DELAYED RELEASE ORAL at 08:27

## 2023-07-22 RX ADMIN — Medication 1 TABLET(S): at 11:51

## 2023-07-22 RX ADMIN — SODIUM CHLORIDE 100 MILLILITER(S): 9 INJECTION, SOLUTION INTRAVENOUS at 09:27

## 2023-07-22 RX ADMIN — Medication 100 MILLIGRAM(S): at 14:33

## 2023-07-22 RX ADMIN — Medication 50 MILLIGRAM(S): at 05:33

## 2023-07-22 RX ADMIN — SODIUM CHLORIDE 100 MILLILITER(S): 9 INJECTION, SOLUTION INTRAVENOUS at 05:32

## 2023-07-22 NOTE — PROGRESS NOTE ADULT - SUBJECTIVE AND OBJECTIVE BOX
Pt stable  No significant abd pain    Lipase: 1505 U/L (07.22.23 @ 07:13)      MEDICATIONS  (STANDING):  amLODIPine   Tablet 10 milliGRAM(s) Oral daily  folic acid 1 milliGRAM(s) Oral daily  lactated ringers. 1000 milliLiter(s) (100 mL/Hr) IV Continuous <Continuous>  metoprolol succinate ER 50 milliGRAM(s) Oral daily  multivitamin 1 Tablet(s) Oral daily  pantoprazole    Tablet 40 milliGRAM(s) Oral before breakfast  thiamine 100 milliGRAM(s) Oral daily    MEDICATIONS  (PRN):  aluminum hydroxide/magnesium hydroxide/simethicone Suspension 30 milliLiter(s) Oral every 4 hours PRN Dyspepsia  melatonin 3 milliGRAM(s) Oral at bedtime PRN Insomnia  morphine  - Injectable 2 milliGRAM(s) IV Push every 4 hours PRN Severe Pain (7 - 10)  ondansetron Injectable 4 milliGRAM(s) IV Push every 8 hours PRN Nausea and/or Vomiting  oxyCODONE    IR 5 milliGRAM(s) Oral every 6 hours PRN Moderate Pain (4 - 6)      Allergies    No Known Allergies    Intolerances        Vital Signs Last 24 Hrs  T(C): 37 (22 Jul 2023 17:52), Max: 37 (22 Jul 2023 17:52)  T(F): 98.6 (22 Jul 2023 17:52), Max: 98.6 (22 Jul 2023 17:52)  HR: 82 (22 Jul 2023 17:52) (72 - 88)  BP: 123/86 (22 Jul 2023 17:52) (104/66 - 125/88)  BP(mean): --  RR: 18 (22 Jul 2023 17:52) (18 - 18)  SpO2: 99% (22 Jul 2023 17:52) (98% - 99%)    Parameters below as of 22 Jul 2023 17:52  Patient On (Oxygen Delivery Method): room air        PHYSICAL EXAM:  General: NAD.  CVS: S1, S2  Chest: air entry bilaterally present  Abd: BS present, soft, non-tender      LABS:                        14.5   8.60  )-----------( 228      ( 21 Jul 2023 07:06 )             41.6     07-21    141  |  111<H>  |  9   ----------------------------<  147<H>  4.4   |  28  |  0.93    Ca    8.8      21 Jul 2023 07:06  Phos  2.3     07-21  Mg     2.2     07-21      continue clear liquids diet  Lipase in AM

## 2023-07-23 LAB — LIDOCAIN IGE QN: 1251 U/L — HIGH (ref 73–393)

## 2023-07-23 RX ADMIN — Medication 1 MILLIGRAM(S): at 12:27

## 2023-07-23 RX ADMIN — Medication 50 MILLIGRAM(S): at 05:20

## 2023-07-23 RX ADMIN — Medication 1 TABLET(S): at 12:27

## 2023-07-23 RX ADMIN — Medication 100 MILLIGRAM(S): at 12:27

## 2023-07-23 RX ADMIN — AMLODIPINE BESYLATE 10 MILLIGRAM(S): 2.5 TABLET ORAL at 05:20

## 2023-07-23 RX ADMIN — PANTOPRAZOLE SODIUM 40 MILLIGRAM(S): 20 TABLET, DELAYED RELEASE ORAL at 07:59

## 2023-07-23 NOTE — PROGRESS NOTE ADULT - SUBJECTIVE AND OBJECTIVE BOX
Pt stable   minimal abd discomfort    Lipase: 1251 U/L (07.23.23 @ 07:10)      MEDICATIONS  (STANDING):  amLODIPine   Tablet 10 milliGRAM(s) Oral daily  folic acid 1 milliGRAM(s) Oral daily  lactated ringers. 1000 milliLiter(s) (100 mL/Hr) IV Continuous <Continuous>  metoprolol succinate ER 50 milliGRAM(s) Oral daily  multivitamin 1 Tablet(s) Oral daily  pantoprazole    Tablet 40 milliGRAM(s) Oral before breakfast  thiamine 100 milliGRAM(s) Oral daily    MEDICATIONS  (PRN):  aluminum hydroxide/magnesium hydroxide/simethicone Suspension 30 milliLiter(s) Oral every 4 hours PRN Dyspepsia  melatonin 3 milliGRAM(s) Oral at bedtime PRN Insomnia  morphine  - Injectable 2 milliGRAM(s) IV Push every 4 hours PRN Severe Pain (7 - 10)  ondansetron Injectable 4 milliGRAM(s) IV Push every 8 hours PRN Nausea and/or Vomiting  oxyCODONE    IR 5 milliGRAM(s) Oral every 6 hours PRN Moderate Pain (4 - 6)      Allergies    No Known Allergies    Intolerances        Vital Signs Last 24 Hrs  T(C): 36.9 (23 Jul 2023 17:27), Max: 36.9 (23 Jul 2023 17:27)  T(F): 98.4 (23 Jul 2023 17:27), Max: 98.4 (23 Jul 2023 17:27)  HR: 91 (23 Jul 2023 17:27) (85 - 91)  BP: 113/77 (23 Jul 2023 17:27) (103/70 - 129/89)  BP(mean): --  RR: 18 (23 Jul 2023 17:27) (18 - 18)  SpO2: 100% (23 Jul 2023 17:27) (98% - 100%)    Parameters below as of 23 Jul 2023 17:27  Patient On (Oxygen Delivery Method): room air        PHYSICAL EXAM:  General: NAD.  CVS: S1, S2  Chest: air entry bilaterally present  Abd: BS present, soft, non-tender      on clear liquids  Lipase in AM

## 2023-07-24 VITALS
DIASTOLIC BLOOD PRESSURE: 80 MMHG | SYSTOLIC BLOOD PRESSURE: 114 MMHG | OXYGEN SATURATION: 99 % | RESPIRATION RATE: 18 BRPM | HEART RATE: 93 BPM | TEMPERATURE: 99 F

## 2023-07-24 LAB
ALBUMIN SERPL ELPH-MCNC: 3.6 G/DL — SIGNIFICANT CHANGE UP (ref 3.3–5)
ALP SERPL-CCNC: 113 U/L — SIGNIFICANT CHANGE UP (ref 40–120)
ALT FLD-CCNC: 85 U/L — HIGH (ref 12–78)
ANION GAP SERPL CALC-SCNC: 5 MMOL/L — SIGNIFICANT CHANGE UP (ref 5–17)
AST SERPL-CCNC: 25 U/L — SIGNIFICANT CHANGE UP (ref 15–37)
BILIRUB SERPL-MCNC: 0.6 MG/DL — SIGNIFICANT CHANGE UP (ref 0.2–1.2)
BUN SERPL-MCNC: 6 MG/DL — LOW (ref 7–23)
CALCIUM SERPL-MCNC: 9.4 MG/DL — SIGNIFICANT CHANGE UP (ref 8.5–10.1)
CHLORIDE SERPL-SCNC: 110 MMOL/L — HIGH (ref 96–108)
CO2 SERPL-SCNC: 26 MMOL/L — SIGNIFICANT CHANGE UP (ref 22–31)
CREAT SERPL-MCNC: 0.84 MG/DL — SIGNIFICANT CHANGE UP (ref 0.5–1.3)
EGFR: 110 ML/MIN/1.73M2 — SIGNIFICANT CHANGE UP
GLUCOSE SERPL-MCNC: 108 MG/DL — HIGH (ref 70–99)
HCT VFR BLD CALC: 42.2 % — SIGNIFICANT CHANGE UP (ref 39–50)
HGB BLD-MCNC: 14.2 G/DL — SIGNIFICANT CHANGE UP (ref 13–17)
LIDOCAIN IGE QN: 1149 U/L — HIGH (ref 73–393)
MCHC RBC-ENTMCNC: 28.6 PG — SIGNIFICANT CHANGE UP (ref 27–34)
MCHC RBC-ENTMCNC: 33.6 G/DL — SIGNIFICANT CHANGE UP (ref 32–36)
MCV RBC AUTO: 85.1 FL — SIGNIFICANT CHANGE UP (ref 80–100)
NRBC # BLD: 0 /100 WBCS — SIGNIFICANT CHANGE UP (ref 0–0)
PLATELET # BLD AUTO: 243 K/UL — SIGNIFICANT CHANGE UP (ref 150–400)
POTASSIUM SERPL-MCNC: 3.9 MMOL/L — SIGNIFICANT CHANGE UP (ref 3.5–5.3)
POTASSIUM SERPL-SCNC: 3.9 MMOL/L — SIGNIFICANT CHANGE UP (ref 3.5–5.3)
PROT SERPL-MCNC: 7.8 GM/DL — SIGNIFICANT CHANGE UP (ref 6–8.3)
RBC # BLD: 4.96 M/UL — SIGNIFICANT CHANGE UP (ref 4.2–5.8)
RBC # FLD: 13.5 % — SIGNIFICANT CHANGE UP (ref 10.3–14.5)
SODIUM SERPL-SCNC: 141 MMOL/L — SIGNIFICANT CHANGE UP (ref 135–145)
WBC # BLD: 8.59 K/UL — SIGNIFICANT CHANGE UP (ref 3.8–10.5)
WBC # FLD AUTO: 8.59 K/UL — SIGNIFICANT CHANGE UP (ref 3.8–10.5)

## 2023-07-24 PROCEDURE — 99239 HOSP IP/OBS DSCHRG MGMT >30: CPT

## 2023-07-24 RX ADMIN — Medication 1 MILLIGRAM(S): at 11:32

## 2023-07-24 RX ADMIN — Medication 100 MILLIGRAM(S): at 11:31

## 2023-07-24 RX ADMIN — Medication 1 TABLET(S): at 11:32

## 2023-07-24 RX ADMIN — PANTOPRAZOLE SODIUM 40 MILLIGRAM(S): 20 TABLET, DELAYED RELEASE ORAL at 07:39

## 2023-07-24 RX ADMIN — Medication 50 MILLIGRAM(S): at 05:23

## 2023-07-24 NOTE — DISCHARGE NOTE PROVIDER - NSDCMRMEDTOKEN_GEN_ALL_CORE_FT
amLODIPine 10 mg oral tablet: 1 tab(s) orally once a day  folic acid 1 mg oral tablet: 1 tab(s) orally once a day  metoprolol succinate 50 mg oral tablet, extended release: 1 tab(s) orally once a day  Multiple Vitamins oral tablet: 1 tab(s) orally once a day  pantoprazole 40 mg oral delayed release tablet: 1 tab(s) orally 2 times a day   thiamine 100 mg oral tablet: 1 tab(s) orally once a day

## 2023-07-24 NOTE — DISCHARGE NOTE NURSING/CASE MANAGEMENT/SOCIAL WORK - NSDPDISTO_GEN_ALL_CORE
Spoke to pt's mom today and she wants to make sure they got the info for the MRI center she is going to   I did see the info entered into the mri order  She is asking as soon as it gets authorized if someone can call her so she can get it scheduled    CB: [phone] Chaya Horowitz
Home

## 2023-07-24 NOTE — PROGRESS NOTE ADULT - SUBJECTIVE AND OBJECTIVE BOX
Pt comfortable  Lipase level pending from this morning      MEDICATIONS  (STANDING):  amLODIPine   Tablet 10 milliGRAM(s) Oral daily  folic acid 1 milliGRAM(s) Oral daily  lactated ringers. 1000 milliLiter(s) (100 mL/Hr) IV Continuous <Continuous>  metoprolol succinate ER 50 milliGRAM(s) Oral daily  multivitamin 1 Tablet(s) Oral daily  pantoprazole    Tablet 40 milliGRAM(s) Oral before breakfast  thiamine 100 milliGRAM(s) Oral daily    MEDICATIONS  (PRN):  aluminum hydroxide/magnesium hydroxide/simethicone Suspension 30 milliLiter(s) Oral every 4 hours PRN Dyspepsia  melatonin 3 milliGRAM(s) Oral at bedtime PRN Insomnia  morphine  - Injectable 2 milliGRAM(s) IV Push every 4 hours PRN Severe Pain (7 - 10)  ondansetron Injectable 4 milliGRAM(s) IV Push every 8 hours PRN Nausea and/or Vomiting  oxyCODONE    IR 5 milliGRAM(s) Oral every 6 hours PRN Moderate Pain (4 - 6)      Allergies    No Known Allergies    Intolerances        Vital Signs Last 24 Hrs  T(C): 36.5 (24 Jul 2023 05:23), Max: 36.9 (23 Jul 2023 17:27)  T(F): 97.7 (24 Jul 2023 05:23), Max: 98.4 (23 Jul 2023 17:27)  HR: 81 (24 Jul 2023 05:23) (81 - 91)  BP: 104/69 (24 Jul 2023 05:23) (103/70 - 113/77)  BP(mean): --  RR: 18 (24 Jul 2023 05:23) (18 - 18)  SpO2: 98% (24 Jul 2023 05:23) (98% - 100%)    Parameters below as of 24 Jul 2023 05:23  Patient On (Oxygen Delivery Method): room air        PHYSICAL EXAM:  General: NAD.  CVS: S1, S2  Chest: air entry bilaterally present  Abd: BS present, soft, non-tender      continue protonix  awaiting lipase results - possible discharge --> pt without any abdominal pain

## 2023-07-24 NOTE — PROGRESS NOTE ADULT - PROBLEM SELECTOR PLAN 2
AST//337  ? due to alcohol use  Monitor LFT  GI consulted  Hold statin

## 2023-07-24 NOTE — DISCHARGE NOTE PROVIDER - NSDCFUADDINST_GEN_ALL_CORE_FT
It is important to see your primary physician as well as any specialty physicians within the next week to perform a comprehensive medical review.  Call their offices for an appointment as soon as you leave the hospital.  You will also need to see them for renewal of your medications.  If have any difficulty following with a physician, contact the [facility] [phone] or via [email or web address].   To obtain your results, you can access the [employer] Patient Portal at [email or web address].  Your medical issues appear to be stable at this time, but if your symptoms recur or worsen, contact your physicians and/or return to the hospital if necessary.  If you encounter any issues or questions with your medication, call your physicians before stopping the medication.  Do not drive.  Limit your diet to 2 grams of sodium daily.  Stop any alcohol intake.    It is important to see your primary physician as well as any specialty physicians within the next week to perform a comprehensive medical review.  Call their offices for an appointment as soon as you leave the hospital.  You will also need to see them for renewal of your medications.  If have any difficulty following with a physician, contact the [facility] [phone] or via [email or web address].   To obtain your results, you can access the [employer] Patient Portal at [email or web address].  Your medical issues appear to be stable at this time, but if your symptoms recur or worsen, contact your physicians and/or return to the hospital if necessary.  If you encounter any issues or questions with your medication, call your physicians before stopping the medication.  Do not drive.  Limit your diet to 2 grams of sodium daily.

## 2023-07-24 NOTE — DISCHARGE NOTE NURSING/CASE MANAGEMENT/SOCIAL WORK - NSDCPEFALRISK_GEN_ALL_CORE
For information on Fall & Injury Prevention, visit: [email or web address] OR  [email or web address] OR  https://www.cdc.gov/steadi/patient.html

## 2023-07-24 NOTE — DISCHARGE NOTE PROVIDER - NSDCCPCAREPLAN_GEN_ALL_CORE_FT
PRINCIPAL DISCHARGE DIAGNOSIS  Diagnosis: Acute pancreatitis  Assessment and Plan of Treatment:       SECONDARY DISCHARGE DIAGNOSES  Diagnosis: Hyperlipidemia, unspecified  Assessment and Plan of Treatment:     Diagnosis: Moderate alcohol use disorder  Assessment and Plan of Treatment:     Diagnosis: Benign essential HTN  Assessment and Plan of Treatment:     Diagnosis: Alcoholic hepatitis  Assessment and Plan of Treatment:

## 2023-07-24 NOTE — DISCHARGE NOTE PROVIDER - HOSPITAL COURSE
43 years old female with h/o HTN, HLD, alcohol use, h/o pancreatitis present to Ed wtih complain of abdominal pain, admitted for Pancreatitis.    # Alcoholic Pancreatitis & Hepatitis - LFTs improving.  Lipase trending down.  Pt asymptomatic, tolerating po intake, wishes d/c home.  I discussed with GI.  Pt advised to take clears diet for several days, emphasized outpatient f/u to ensure resolution of lab abnormalities.  Return to ED if he experiences any recurrence / worsening of symptoms.    # Alcohol Abuse - does not appear to be in acute withdrawal.  Counselled on cessation.  # Essential HTN - Monitor BP.  Continue antihypertensives.  # Hyperlipidemia - diet modification.  discussed stopping statin for now due to abnormal LFTs.  # DVT Prophylaxis - OOB  Stable for d/chome.      Disposition: Stable for discharge.  Outpatient followup discussed.  Total time spent on discharge is  45 minutes.

## 2023-07-24 NOTE — PROGRESS NOTE ADULT - ASSESSMENT
43 years old female with h/o HTN, HLD, alcohol use, h/o pancreatitis present to Ed wtih complain of abdominal pain    acute alcoholic pancreatitis Lipase levels trending up   keep patient NPO   d/w Dr. Young- tolerating CLD- advance to FLD  FU lipase in am  IVF  will need MRI- currently machine out of order- may do as outpt per GI/ Dr. Young if pt gets discharged before MRI machine is back to work.     hx etoh abuse  counseled and educated to stop etoh abuse    elevated LFTs- likely sec top etoh abuse  trend LFTs    anticipate dc tomorrow if lipase continues to improve per GI    d/w pt in detail  
43 years old female with h/o HTN, HLD, alcohol use, h/o pancreatitis present to Ed wtih complain of abdominal pain    acute alcoholic pancreatitis  lipase down trending  d/w Dr. Young- tolerating CLD- advance to FLD  FU lipase in am  IVF  will need MRI- currently machine out of order- may do as outpt per GI/ Dr. Young if pt gets discharged before MRI machine is back to work.     hx etoh abuse  counseled and educated to stop etoh abuse    elevated LFTs- likely sec top etoh abuse  trend LFTs    anticipate dc tomorrow if lipase continues to improve per GI    d/w pt in detail  
43 years old female with h/o HTN, HLD, alcohol use, h/o pancreatitis present to Ed wtih complain of abdominal pain, admitted for Pancreatitis.    # Alcoholic Pancreatitis & Hepatitis - LFTs improving.  Lipase trending down.  Pt asymptomatic, tolerating po intake, wishes d/c home.  I discussed with GI.  Pt advised to take clears diet for several days, emphasized outpatient f/u to ensure resolution of lab abnormalities.  Return to ED if he experiences any recurrence / worsening of symptoms.    # Alcohol Abuse - does not appear to be in acute withdrawal.  Counselled on cessation.  # Essential HTN - Monitor BP.  Continue antihypertensives.  # Hyperlipidemia - diet modification.  discussed stopping statin for now due to abnormal LFTs.  # DVT Prophylaxis - OOB  Stable for d/chome.

## 2023-07-24 NOTE — PROGRESS NOTE ADULT - SUBJECTIVE AND OBJECTIVE BOX
Patient: OLGA LIDIA BRASHER [id number] 43y Male                            Hospitalist Attending Note    My onservice today.  Pt denies complaints.  No Abdominal pain, nausea, vomiting, diarrhea, nor constipation.   Tolerating po intake without difficulty.  ambulating freely.       ____________________PHYSICAL EXAM:  GENERAL:  NAD Alert and Oriented x 3   HEENT: NCAT  CARDIOVASCULAR:  S1, S2  LUNGS: CTAB  ABDOMEN:  soft, (-) tenderness, (-) distension, (+) bowel sounds, (-) guarding, (-) rebound (-) rigidity  EXTREMITIES:  no cyanosis / clubbing / edema.   ____________________     VITALS:  Vital Signs Last 24 Hrs  T(C): 37 (24 Jul 2023 10:51), Max: 37 (24 Jul 2023 10:51)  T(F): 98.6 (24 Jul 2023 10:51), Max: 98.6 (24 Jul 2023 10:51)  HR: 93 (24 Jul 2023 10:51) (81 - 93)  BP: 114/80 (24 Jul 2023 10:51) (103/70 - 114/80)  BP(mean): --  RR: 18 (24 Jul 2023 10:51) (18 - 18)  SpO2: 99% (24 Jul 2023 10:51) (98% - 100%)    Parameters below as of 24 Jul 2023 10:51  Patient On (Oxygen Delivery Method): room air     Daily     Daily   CAPILLARY BLOOD GLUCOSE        I&O's Summary    23 Jul 2023 07:01  -  24 Jul 2023 07:00  --------------------------------------------------------  IN: 0 mL / OUT: 4 mL / NET: -4 mL        HISTORY:  PAST MEDICAL & SURGICAL HISTORY:  HTN (hypertension)      Tachycardia      Alcohol abuse      No significant past surgical history      Allergies    No Known Allergies    Intolerances       LABS:                        14.2   8.59  )-----------( 243      ( 24 Jul 2023 06:50 )             42.2     07-24    141  |  110<H>  |  6<L>  ----------------------------<  108<H>  3.9   |  26  |  0.84    Ca    9.4      24 Jul 2023 06:50    TPro  7.8  /  Alb  3.6  /  TBili  0.6  /  DBili  x   /  AST  25  /  ALT  85<H>  /  AlkPhos  113  07-24      LIVER FUNCTIONS - ( 24 Jul 2023 06:50 )  Alb: 3.6 g/dL / Pro: 7.8 gm/dL / ALK PHOS: 113 U/L / ALT: 85 U/L / AST: 25 U/L / GGT: x           Urinalysis Basic - ( 24 Jul 2023 06:50 )    Color: x / Appearance: x / SG: x / pH: x  Gluc: 108 mg/dL / Ketone: x  / Bili: x / Urobili: x   Blood: x / Protein: x / Nitrite: x   Leuk Esterase: x / RBC: x / WBC x   Sq Epi: x / Non Sq Epi: x / Bacteria: x              MEDICATIONS:  MEDICATIONS  (STANDING):  amLODIPine   Tablet 10 milliGRAM(s) Oral daily  folic acid 1 milliGRAM(s) Oral daily  lactated ringers. 1000 milliLiter(s) (100 mL/Hr) IV Continuous <Continuous>  metoprolol succinate ER 50 milliGRAM(s) Oral daily  multivitamin 1 Tablet(s) Oral daily  pantoprazole    Tablet 40 milliGRAM(s) Oral before breakfast  thiamine 100 milliGRAM(s) Oral daily    MEDICATIONS  (PRN):  aluminum hydroxide/magnesium hydroxide/simethicone Suspension 30 milliLiter(s) Oral every 4 hours PRN Dyspepsia  melatonin 3 milliGRAM(s) Oral at bedtime PRN Insomnia  morphine  - Injectable 2 milliGRAM(s) IV Push every 4 hours PRN Severe Pain (7 - 10)  ondansetron Injectable 4 milliGRAM(s) IV Push every 8 hours PRN Nausea and/or Vomiting  oxyCODONE    IR 5 milliGRAM(s) Oral every 6 hours PRN Moderate Pain (4 - 6)

## 2023-07-24 NOTE — DISCHARGE NOTE PROVIDER - CARE PROVIDER_API CALL
Uzair Young  Gastroenterology  [address], [address]  [place] [address]  Phone: [phone]  Fax: [phone]  Follow Up Time:

## 2023-07-24 NOTE — PROGRESS NOTE ADULT - PROBLEM SELECTOR PLAN 5
thiamine, multivitamin, folic acid  Cessation education  Monitor for alcohol withdrawal

## 2023-07-24 NOTE — PROGRESS NOTE ADULT - PROVIDER SPECIALTY LIST ADULT
Gastroenterology
Hospitalist

## 2023-07-24 NOTE — DISCHARGE NOTE NURSING/CASE MANAGEMENT/SOCIAL WORK - PATIENT PORTAL LINK FT
You can access the FollowMyHealth Patient Portal offered by [facility] by registering at the following website: [email or web address]. By joining [facility]s FollowMyHealth portal, you will also be able to view your health information using other applications (apps) compatible with our system.

## 2023-07-24 NOTE — PROGRESS NOTE ADULT - REASON FOR ADMISSION
acute pancreatitis

## 2023-07-24 NOTE — PROGRESS NOTE ADULT - PROBLEM SELECTOR PLAN 4
If elevated LFT resolved, will then resume statin

## 2023-07-28 DIAGNOSIS — R79.89 OTHER SPECIFIED ABNORMAL FINDINGS OF BLOOD CHEMISTRY: ICD-10-CM

## 2023-07-28 DIAGNOSIS — F10.10 ALCOHOL ABUSE, UNCOMPLICATED: ICD-10-CM

## 2023-07-28 DIAGNOSIS — K85.20 ALCOHOL INDUCED ACUTE PANCREATITIS WITHOUT NECROSIS OR INFECTION: ICD-10-CM

## 2023-07-28 DIAGNOSIS — E78.5 HYPERLIPIDEMIA, UNSPECIFIED: ICD-10-CM

## 2023-07-28 DIAGNOSIS — I10 ESSENTIAL (PRIMARY) HYPERTENSION: ICD-10-CM

## 2023-07-28 DIAGNOSIS — K70.10 ALCOHOLIC HEPATITIS WITHOUT ASCITES: ICD-10-CM

## 2025-05-30 NOTE — ED ADULT NURSE NOTE - SUICIDE SCREENING QUESTION 1
No Detail Level: Detailed Quality 226: Preventive Care And Screening: Tobacco Use: Screening And Cessation Intervention: Tobacco Screening not Performed Quality 130: Documentation Of Current Medications In The Medical Record: Current Medications Documented